# Patient Record
Sex: MALE | Race: WHITE | NOT HISPANIC OR LATINO | URBAN - METROPOLITAN AREA
[De-identification: names, ages, dates, MRNs, and addresses within clinical notes are randomized per-mention and may not be internally consistent; named-entity substitution may affect disease eponyms.]

---

## 2017-10-02 ENCOUNTER — TRANSCRIBE ORDERS (OUTPATIENT)
Dept: PHYSICAL THERAPY | Facility: HOSPITAL | Age: 62
End: 2017-10-02

## 2017-10-02 DIAGNOSIS — I89.0 LYMPHEDEMA: ICD-10-CM

## 2017-10-02 DIAGNOSIS — C32.9 LARYNX CANCER (HCC): Primary | ICD-10-CM

## 2017-10-10 ENCOUNTER — HOSPITAL ENCOUNTER (OUTPATIENT)
Dept: OCCUPATIONAL THERAPY | Facility: HOSPITAL | Age: 62
Setting detail: THERAPIES SERIES
Discharge: HOME | End: 2017-10-10

## 2017-10-10 DIAGNOSIS — I89.0 LYMPHEDEMA IN ADULT PATIENT: Primary | ICD-10-CM

## 2017-10-10 PROCEDURE — 97165 OT EVAL LOW COMPLEX 30 MIN: CPT

## 2017-10-10 NOTE — THERAPY EVALUATION
Outpatient Occupational Therapy Lymphedema Initial Evaluation  [facility]     Patient Name: Lawrence Jefferson  : 1955  MRN: [id number]  Today's Date: 10/10/2017      Visit Date: 10/10/2017    There is no problem list on file for this patient.       Past Medical History:   Diagnosis Date   • Cancer    • Hypertension         Past Surgical History:   Procedure Laterality Date   • HERNIA REPAIR     • LARYNGECTOMY WITH NECK DISSECTION           Visit Dx:     ICD-10-CM ICD-9-CM   1. Lymphedema in adult patient I89.0 457.1             Patient History       10/10/17 1500          History    Chief Complaint Other 1 (comment)   face and neck edema, decreased neck AROM  -RE      Date Current Problem(s) Began 17  -RE      Brief Description of Current Complaint Patient presents for evaluation and treatment of lymphedema of the head and neck following a total larynectomy, BSND 2-5, neck dissection, cricophaaryngeomyotomy, left lat skin and muscle free flap.   -RE      Previous treatment for THIS PROBLEM Rehabilitation   SLP saw for MLD  -RE      Patient/Caregiver Goals Decrease swelling  -RE      How has patient tried to help current problem? currently performing self MLD, AROM and wearing compression at night  -RE      Pain     Pain Location Back  -RE      Pain at Present 1  -RE      Pain at Best 0  -RE      Pain at Worst 2  -RE      Fall Risk Assessment    Any falls in the past year: No  -RE      Services    Are you currently receiving Home Health services No  -RE      Daily Activities    Primary Language English  -RE      How does patient learn best? Reading  -RE      Teaching needs identified Home Exercise Program;Management of Condition  -RE      Patient is concerned about/has problems with Performing job responsibilities/community activities (work, school,   talking,   -RE      Does patient have problems with the following? None  -RE      Barriers to learning Communication;Other (comment)   Aphonia  -RE      Pt  Participated in POC and Goals Yes  -RE      Safety    Are you being hurt, hit, or frightened by anyone at home or in your life? No  -RE      Are you being neglected by a caregiver No  -RE        User Key  (r) = Recorded By, (t) = Taken By, (c) = Cosigned By    Initials Name Provider Type    TAYA Graham Occupational Therapist                Lymphedema       10/10/17 1500          Subjective Comments    Subjective Comments Gave a good history with white board  -RE      Lymphedema Assessment    Lymphedema Classification Face:;Neck:;secondary;stage 1 (Spontaneously Reversible);stage 2 (Spontaneously Irreversible)  -RE      Lymphedema Cancer Related Sx reconstructive;other (comment)   Laryngectomy, neck dissection, Lat free flap  -RE      Lymph Nodes Removed # 49  -RE      Positive Lymph Nodes # 0  -RE      Stage of Cancer Stage III   T3N0M0  -RE      Chemo Received no  -RE      Radiation Therapy Received yes  -RE      Radiation Treatments #/Timeframe 30  -RE      Infections or Cellulitis? no  -RE      Lymphedema Edema Assessment    Ptting Edema Category By grade out of 4  -RE      Pitting Edema + 2/4;Moderate;+ 3/4  -RE      Edema Assessment Comment Edema noted from L ear to R ear includding a firm area submentally. Extends to clavical.  -RE      Skin Changes/Observations    Skin Observations Comment Incisions are well healed and stoma is symmetrical. Skin color is normal. Scar mobility is better on the R side but is impaired bilaterally.    -RE      Lymphedema Sensation    Lymphedema Sensation Comments No sensation inthe central neck, L ear and left neck. Also lacks sensation over the R shoulder and chest area.   -RE      Compression/Skin Care    Compression/Skin Care Comments Pt has compression neck support  -RE        User Key  (r) = Recorded By, (t) = Taken By, (c) = Cosigned By    Initials Name Provider Type    TAYA Graham Occupational Therapist                        Therapy Education        "10/10/17 1883          Therapy Education    Education Details Ptrogram and plan. Revviewed current HEP. Encouraged gentle MLD and slow ROM  -RE      Given HEP;Symptoms/condition management;Edema management  -RE      Program New  -RE      How Provided Verbal;Demonstration  -RE      Provided to Patient  -RE      Level of Understanding Teach back education performed;Verbalized;Demonstrated  -RE        User Key  (r) = Recorded By, (t) = Taken By, (c) = Cosigned By    Initials Name Provider Type    RE Hilary Nunez OTR Occupational Therapist                        OT Goals       10/10/17 1500       OT Short Term Goals    STG Date to Achieve 10/24/17  -RE     STG 1 Patient will demonstrate proper awareness of What is Lymphedema and \"Do's & Don'ts\" for improved prevention, management, care of symptoms and ease of transition to self-care of condition.   -RE     STG 1 Progress New  -RE     STG 2 Patient independent and compliant with self-massage techniques with spouse/family member as needed for improved lymphatic drainage, decreased edema/symptoms, decreased risk of infection and improved transition to self-care of condition.   -RE     STG 2 Progress New;Ongoing  -RE     STG 2 Progress Comments Patient has program from previous therapist which will revise as needed.  -RE     STG 3 Patient independent and compliant with initial home exercise program focused on diaphragmatic breathing, range of motion, flexibility to decrease edema and improve lymphatic flow for decreased edema and decreased risk of infection.   -RE     STG 3 Progress New;Ongoing  -RE     STG 3 Progress Comments Will modify current program as needed.  -RE     STG 4 Patient will report decreased fullness in the neck and face.   -RE     STG 4 Progress New  -RE     Long Term Goals    LTG Date to Achieve 11/10/17  -RE     LTG 1 Edema in the submental area will decrease to 1/4.   -RE     LTG 1 Progress New  -RE     Time Calculation    OT Goal Re-Cert Due Date " 01/10/18  -RE       User Key  (r) = Recorded By, (t) = Taken By, (c) = Cosigned By    Initials Name Provider Type    RE TAYA Childs Occupational Therapist                OT Assessment/Plan       10/10/17 1547       OT Assessment    Functional Limitations Performance in work activities  -RE     Impairments Edema;Impaired lymphatic circulation;Impaired sensory integrity;Integumentary integrity;Range of motion  -RE     Assessment Comments Patient presents with moderate head and neck edema and decreased neck AROM due to radiation and surgery.  He could benefit from Complete Decongestive Therapy to decrease edema, improve scar mobility, improve AROM,  speed healing to allow for prosthesis, and to learn self care strategies.  -RE     Please refer to paper survey for additional self-reported information Yes  -RE     OT Diagnosis Head and neck radiation  -RE     OT Rehab Potential Good  -RE     Patient/caregiver participated in establishment of treatment plan and goals Yes  -RE     Patient would benefit from skilled therapy intervention Yes  -RE     OT Plan    OT Frequency 3x/week  -RE     Predicted Duration of Therapy Intervention (days/wks) 2-4 weeks  -RE     Planned CPT's? OT EVAL LOW COMPLEXITY: 86875;OT THER ACT EA 15 MIN: 91699KP;OT THER PROC EA 15 MIN: 91722ON;OT SELF CARE/MGMT/TRAIN 15 MIN: 28899;OT MANUAL THERAPY EA 15 MIN: 81553  -RE     Planned Therapy Interventions (Optional Details) home exercise program;manual therapy techniques;patient/family education;other (see comments)   compression therapy  -RE       User Key  (r) = Recorded By, (t) = Taken By, (c) = Cosigned By    Initials Name Provider Type    RE TAYA Childs Occupational Therapist                Time Calculation:   OT Start Time: 1330  OT Stop Time: 1415  OT Time Calculation (min): 45 min     Therapy Charges for Today     Code Description Service Date Service Provider Modifiers Qty    [id number]  OT EVAL LOW COMPLEXITY 3 10/10/2017 Hilary  TAYA Nunez GO 1                    TAYA Childs  10/10/2017

## 2017-10-13 ENCOUNTER — HOSPITAL ENCOUNTER (OUTPATIENT)
Dept: OCCUPATIONAL THERAPY | Facility: HOSPITAL | Age: 62
Setting detail: THERAPIES SERIES
Discharge: HOME | End: 2017-10-13

## 2017-10-13 DIAGNOSIS — I89.0 LYMPHEDEMA IN ADULT PATIENT: Primary | ICD-10-CM

## 2017-10-13 PROCEDURE — 97110 THERAPEUTIC EXERCISES: CPT

## 2017-10-13 PROCEDURE — 97140 MANUAL THERAPY 1/> REGIONS: CPT

## 2017-10-13 NOTE — THERAPY TREATMENT NOTE
Outpatient Occupational Therapy Lymphedema Treatment Note  [facility]     Patient Name: Lawrence Jefferson  : 1955  MRN: [id number]  Today's Date: 10/13/2017      Visit Date: 10/13/2017    There is no problem list on file for this patient.       Past Medical History:   Diagnosis Date   • Cancer    • Hypertension         Past Surgical History:   Procedure Laterality Date   • HERNIA REPAIR     • LARYNGECTOMY WITH NECK DISSECTION           Visit Dx:      ICD-10-CM ICD-9-CM   1. Lymphedema in adult patient I89.0 457.1             Lymphedema       10/13/17 1400          Subjective Comments    Subjective Comments Said neck feels tight  -RE      Subjective Pain    Able to rate subjective pain? yes  -RE      Pre-Treatment Pain Level 0  -RE      Post-Treatment Pain Level 0  -RE      Manual Lymphatic Drainage    Manual Lymphatic Drainage initial sequence;opened regional lymph nodes;opened anastamoses;head/neck treatment  -RE      Initial Sequence full neck;cervical;supraclavicular  -RE      Cervical right;left  -RE      Supraclavicular right;left  -RE      Opened Regional Lymph Nodes axillary  -RE      Axillary right;left  -RE      Opened Anastamoses other opened anastamoses  -RE      Head/Neck Treatment pre-auricular nodes;post-auricular nodes;occipital nodes;sublingual nodes;full/complex MLD;other head/neck treatment   Scar massage  -RE      Compression/Skin Care    Compression/Skin Care compression garment   Made Komprex II pad for compression garment to soften edema  -RE        User Key  (r) = Recorded By, (t) = Taken By, (c) = Cosigned By    Initials Name Provider Type    RE TAYA Childs Occupational Therapist                        OT Assessment/Plan       10/13/17 8011       OT Assessment    Assessment Comments Tolerated MLD and scar massage well.  Noted some edema softening and slight increase in scar mobility.  -RE     OT Plan    OT Plan Comments Continue  -RE       User Key  (r) = Recorded By, (t) = Taken  By, (c) = Cosigned By    Initials Name Provider Type    RE TAYA Childs Occupational Therapist                                Therapy Education       10/13/17 4797          Therapy Education    Education Details Do not sleep in new pad only wear garment.  Use pad for max of 30 minuteson and 30 off. Stop use if redness occurs. instucted in scar massage for lat scar. SCM stretch.  -RE      Given Edema management;Bandaging/dressing change  -RE      Program New  -RE      How Provided Verbal;Demonstration  -RE      Provided to Patient  -RE      Level of Understanding Teach back education performed;Verbalized;Demonstrated  -RE        User Key  (r) = Recorded By, (t) = Taken By, (c) = Cosigned By    Initials Name Provider Type    RE TAYA Childs Occupational Therapist                  Time Calculation:   OT Start Time: 1300  OT Stop Time: 1415  OT Time Calculation (min): 75 min       Therapy Charges for Today     Code Description Service Date Service Provider Modifiers Qty    [id number]  OT THER PROC EA 15 MIN 10/13/2017 TAYA Childs GO 1    [id number]  OT MANUAL THERAPY EA 15 MIN 10/13/2017 TAYA Childs GO 4                      TAYA Childs  10/13/2017

## 2017-10-17 ENCOUNTER — HOSPITAL ENCOUNTER (OUTPATIENT)
Dept: OCCUPATIONAL THERAPY | Facility: HOSPITAL | Age: 62
Setting detail: THERAPIES SERIES
Discharge: HOME | End: 2017-10-17

## 2017-10-17 DIAGNOSIS — I89.0 LYMPHEDEMA IN ADULT PATIENT: Primary | ICD-10-CM

## 2017-10-17 PROCEDURE — 97140 MANUAL THERAPY 1/> REGIONS: CPT

## 2017-10-17 NOTE — THERAPY TREATMENT NOTE
Outpatient Occupational Therapy Lymphedema Treatment Note  [facility]     Patient Name: Lawrence Jefferson  : 1955  MRN: [id number]  Today's Date: 10/17/2017      Visit Date: 10/17/2017    There is no problem list on file for this patient.       Past Medical History:   Diagnosis Date   • Cancer    • Hypertension         Past Surgical History:   Procedure Laterality Date   • HERNIA REPAIR     • LARYNGECTOMY WITH NECK DISSECTION           Visit Dx:      ICD-10-CM ICD-9-CM   1. Lymphedema in adult patient I89.0 457.1             Lymphedema       10/17/17 1400          Subjective Comments    Subjective Comments Pt c/o some increased swelling. He had to lower the head of the bed a little because his wife couldn't sleep.  -RE      Subjective Pain    Able to rate subjective pain? yes  -RE      Pre-Treatment Pain Level 0  -RE      Post-Treatment Pain Level 0  -RE      Manual Lymphatic Drainage    Manual Lymphatic Drainage initial sequence;opened regional lymph nodes;opened anastamoses;head/neck treatment  -RE      Initial Sequence full neck;cervical;supraclavicular;shoulder collectors  -RE      Cervical right;left  -RE      Supraclavicular right;left  -RE      Shoulder Collectors right;left  -RE      Opened Regional Lymph Nodes axillary  -RE      Axillary right;left  -RE      Opened Anastamoses other opened anastamoses  -RE      Head/Neck Treatment pre-auricular nodes;post-auricular nodes;occipital nodes;sublingual nodes;full/complex MLD  -RE      Compression/Skin Care    Compression/Skin Care Comments Cut pad to provide more of a chevron to allow fluid to flow around scars.  -RE        User Key  (r) = Recorded By, (t) = Taken By, (c) = Cosigned By    Initials Name Provider Type    RE TAYA Childs Occupational Therapist                        OT Assessment/Plan       10/17/17 2263       OT Assessment    Assessment Comments Submental swelling was increased today but responded  to MLD.  L side of neck with more soft  tissue tightness than right. Pt is pereforming AROM , scar massage and gentle stretches at home.  -RE     OT Plan    OT Plan Comments Continue  -RE       User Key  (r) = Recorded By, (t) = Taken By, (c) = Cosigned By    Initials Name Provider Type    TAYA Graham Occupational Therapist                                Therapy Education       10/17/17 6419          Therapy Education    Education Details Wear new pad for 30 min and thenmay gradually increase wear as his skin tolerates it.    -RE      Given Symptoms/condition management;Edema management;Bandaging/dressing change  -RE      Program Modified  -RE      How Provided Verbal;Demonstration  -RE      Level of Understanding Teach back education performed;Verbalized;Demonstrated  -RE        User Key  (r) = Recorded By, (t) = Taken By, (c) = Cosigned By    Initials Name Provider Type    TAYA Graham Occupational Therapist                  Time Calculation:   OT Start Time: 1300  OT Stop Time: 1400  OT Time Calculation (min): 60 min       Therapy Charges for Today     Code Description Service Date Service Provider Modifiers Qty    [id number]  OT MANUAL THERAPY EA 15 MIN 10/17/2017 TAYA Childs GO 4                      TAYA Childs  10/17/2017

## 2017-10-19 ENCOUNTER — APPOINTMENT (OUTPATIENT)
Dept: OCCUPATIONAL THERAPY | Facility: HOSPITAL | Age: 62
End: 2017-10-19

## 2017-10-20 ENCOUNTER — APPOINTMENT (OUTPATIENT)
Dept: OCCUPATIONAL THERAPY | Facility: HOSPITAL | Age: 62
End: 2017-10-20

## 2017-10-24 ENCOUNTER — HOSPITAL ENCOUNTER (OUTPATIENT)
Dept: OCCUPATIONAL THERAPY | Facility: HOSPITAL | Age: 62
Setting detail: THERAPIES SERIES
Discharge: HOME | End: 2017-10-24

## 2017-10-24 DIAGNOSIS — I89.0 LYMPHEDEMA IN ADULT PATIENT: Primary | ICD-10-CM

## 2017-10-24 PROCEDURE — 97140 MANUAL THERAPY 1/> REGIONS: CPT

## 2017-10-24 NOTE — THERAPY TREATMENT NOTE
Outpatient Occupational Therapy Lymphedema Treatment Note  [facility]     Patient Name: Lawrence Jefferson  : 1955  MRN: [id number]  Today's Date: 10/24/2017      Visit Date: 10/24/2017    There is no problem list on file for this patient.       Past Medical History:   Diagnosis Date   • Cancer    • Hypertension         Past Surgical History:   Procedure Laterality Date   • HERNIA REPAIR     • LARYNGECTOMY WITH NECK DISSECTION           Visit Dx:      ICD-10-CM ICD-9-CM   1. Lymphedema in adult patient I89.0 457.1             Lymphedema       10/24/17 1600          Subjective Comments    Subjective Comments Reports some soreness the day following last session. It resolved. Now able to speak!  -RE      Subjective Pain    Able to rate subjective pain? yes  -RE      Pre-Treatment Pain Level 0  -RE      Post-Treatment Pain Level 0  -RE      Manual Lymphatic Drainage    Manual Lymphatic Drainage initial sequence;opened regional lymph nodes;opened anastamoses;head/neck treatment  -RE      Initial Sequence full neck;cervical;supraclavicular;shoulder collectors  -RE      Cervical right;left  -RE      Head/Neck Treatment pre-auricular nodes;post-auricular nodes;occipital nodes;sublingual nodes;full/complex MLD  -RE      Manual Lymphatic Drainage Comments Scar massage and gentle stretching for neck AROM.  -RE        User Key  (r) = Recorded By, (t) = Taken By, (c) = Cosigned By    Initials Name Provider Type    TAYA Graham Occupational Therapist                        OT Assessment/Plan       10/24/17 1610       OT Assessment    Assessment Comments Edema improved today.  Edema is softer and the skin is more supple.  Progressing well.    -RE     OT Plan    OT Plan Comments Continue  -RE       User Key  (r) = Recorded By, (t) = Taken By, (c) = Cosigned By    Initials Name Provider Type    TAYA Graham Occupational Therapist                                Therapy Education       10/24/17 1611          Therapy  Education    Given HEP  -RE      Program Reinforced  -RE      How Provided Verbal;Demonstration  -RE      Provided to Patient  -RE      Level of Understanding Teach back education performed;Verbalized  -RE        User Key  (r) = Recorded By, (t) = Taken By, (c) = Cosigned By    Initials Name Provider Type    RE TAYA Childs Occupational Therapist                  Time Calculation:   OT Start Time: 1315  OT Stop Time: 1410  OT Time Calculation (min): 55 min       Therapy Charges for Today     Code Description Service Date Service Provider Modifiers Qty    [id number]  OT MANUAL THERAPY EA 15 MIN 10/24/2017 TAYA Childs GO 4                      TAYA Childs  10/24/2017

## 2017-10-26 ENCOUNTER — APPOINTMENT (OUTPATIENT)
Dept: OCCUPATIONAL THERAPY | Facility: HOSPITAL | Age: 62
End: 2017-10-26

## 2017-10-27 ENCOUNTER — HOSPITAL ENCOUNTER (OUTPATIENT)
Dept: OCCUPATIONAL THERAPY | Facility: HOSPITAL | Age: 62
Setting detail: THERAPIES SERIES
Discharge: HOME | End: 2017-10-27

## 2017-10-27 DIAGNOSIS — I89.0 LYMPHEDEMA IN ADULT PATIENT: Primary | ICD-10-CM

## 2017-10-27 PROCEDURE — 97140 MANUAL THERAPY 1/> REGIONS: CPT

## 2017-10-27 PROCEDURE — 97110 THERAPEUTIC EXERCISES: CPT

## 2017-10-27 NOTE — THERAPY TREATMENT NOTE
Outpatient Occupational Therapy Lymphedema Treatment Note  [facility]     Patient Name: Lawrence Jefferson  : 1955  MRN: [id number]  Today's Date: 10/27/2017      Visit Date: 10/27/2017    There is no problem list on file for this patient.       Past Medical History:   Diagnosis Date   • Cancer    • Hypertension         Past Surgical History:   Procedure Laterality Date   • HERNIA REPAIR     • LARYNGECTOMY WITH NECK DISSECTION           Visit Dx:      ICD-10-CM ICD-9-CM   1. Lymphedema in adult patient I89.0 457.1             Lymphedema       10/27/17 1400          Subjective Comments    Subjective Comments No new compaints  -RE      Subjective Pain    Able to rate subjective pain? yes  -RE      Pre-Treatment Pain Level 0  -RE      Post-Treatment Pain Level 0  -RE      Manual Lymphatic Drainage    Manual Lymphatic Drainage initial sequence;opened regional lymph nodes;opened anastamoses;head/neck treatment  -RE      Initial Sequence full neck;cervical;supraclavicular;shoulder collectors  -RE      Cervical right;left  -RE      Supraclavicular right;left  -RE      Shoulder Collectors right;left  -RE      Opened Regional Lymph Nodes axillary  -RE      Axillary right;left  -RE      Opened Anastamoses other opened anastamoses  -RE      Head/Neck Treatment pre-auricular nodes;post-auricular nodes;occipital nodes;sublingual nodes;full/complex MLD  -RE      Manual Lymphatic Drainage Comments Scar massage and gentle manual stretching in the neck area  -RE      Compression/Skin Care    Compression/Skin Care Comments fabricated an elastomer patch to increase compression and remodel scar tissue  -RE        User Key  (r) = Recorded By, (t) = Taken By, (c) = Cosigned By    Initials Name Provider Type    RE TAYA Childs Occupational Therapist                        OT Assessment/Plan       10/27/17 1443       OT Assessment    Assessment Comments Edema continues to decrease revealing a visible jaw line.  Scar areas continue  firm with decreased mobility of the soft tisssue.  Pt to also order the Bains swell spot for night use.    -RE     OT Plan    OT Plan Comments Continue  -RE       User Key  (r) = Recorded By, (t) = Taken By, (c) = Cosigned By    Initials Name Provider Type    TAYA Graham Occupational Therapist                                Therapy Education       10/27/17 1445          Therapy Education    Education Details Wear elastomer patch with current compression garment. Order Bains swell spot. Discussed Flexitouch and its purpose in self management as needed inthe future.  -RE      Given Symptoms/condition management;Bandaging/dressing change  -RE      Program New  -RE      How Provided Verbal;Demonstration;Written  -RE      Provided to Patient  -RE      Level of Understanding Teach back education performed;Verbalized  -RE        User Key  (r) = Recorded By, (t) = Taken By, (c) = Cosigned By    Initials Name Provider Type    TAYA Graham Occupational Therapist                  Time Calculation:   OT Start Time: 1300  OT Stop Time: 1415  OT Time Calculation (min): 75 min       Therapy Charges for Today     Code Description Service Date Service Provider Modifiers Qty    [id number]  OT THER PROC EA 15 MIN 10/27/2017 TAAY Childs GO 1    [id number] HC OT MANUAL THERAPY EA 15 MIN 10/27/2017 TAYA Childs GO 4                      TAYA Childs  10/27/2017

## 2017-10-31 ENCOUNTER — HOSPITAL ENCOUNTER (OUTPATIENT)
Dept: OCCUPATIONAL THERAPY | Facility: HOSPITAL | Age: 62
Setting detail: THERAPIES SERIES
Discharge: HOME | End: 2017-10-31

## 2017-10-31 DIAGNOSIS — I89.0 LYMPHEDEMA IN ADULT PATIENT: Primary | ICD-10-CM

## 2017-10-31 PROCEDURE — 97140 MANUAL THERAPY 1/> REGIONS: CPT

## 2017-11-02 ENCOUNTER — HOSPITAL ENCOUNTER (OUTPATIENT)
Dept: OCCUPATIONAL THERAPY | Facility: HOSPITAL | Age: 62
Setting detail: THERAPIES SERIES
Discharge: HOME | End: 2017-11-02

## 2017-11-02 DIAGNOSIS — I89.0 LYMPHEDEMA IN ADULT PATIENT: Primary | ICD-10-CM

## 2017-11-02 PROCEDURE — 97140 MANUAL THERAPY 1/> REGIONS: CPT

## 2017-11-02 NOTE — THERAPY TREATMENT NOTE
Outpatient Occupational Therapy Lymphedema Treatment Note  [facility]     Patient Name: Lawrence Jefferson  : 1955  MRN: [id number]  Today's Date: 2017      Visit Date: 2017    There is no problem list on file for this patient.       Past Medical History:   Diagnosis Date   • Cancer    • Hypertension         Past Surgical History:   Procedure Laterality Date   • HERNIA REPAIR     • LARYNGECTOMY WITH NECK DISSECTION           Visit Dx:      ICD-10-CM ICD-9-CM   1. Lymphedema in adult patient I89.0 457.1             Lymphedema       17 1900          Subjective Comments    Subjective Comments My sensation is improving and the swelling continues to decrease.  -RE      Subjective Pain    Able to rate subjective pain? yes  -RE      Pre-Treatment Pain Level 0  -RE      Post-Treatment Pain Level 0  -RE      Manual Lymphatic Drainage    Manual Lymphatic Drainage initial sequence;opened regional lymph nodes;opened anastamoses;head/neck treatment  -RE      Initial Sequence full neck;cervical;supraclavicular;shoulder collectors  -RE      Cervical right;left  -RE      Supraclavicular right;left  -RE      Shoulder Collectors right;left  -RE      Opened Regional Lymph Nodes axillary  -RE      Axillary right;left  -RE      Opened Anastamoses other opened anastamoses  -RE      Head/Neck Treatment pre-auricular nodes;post-auricular nodes;occipital nodes;sublingual nodes;full/complex MLD  -RE      Manual Lymphatic Drainage Comments scar massage and manual stretching  -RE        User Key  (r) = Recorded By, (t) = Taken By, (c) = Cosigned By    Initials Name Provider Type    RE Hilary Nunez OTALEX Occupational Therapist                        OT Assessment/Plan       17       OT Assessment    Assessment Comments Edema continues to decrease slowly but steadily.  Does not tolerate elastomer all night yet.  -RE     OT Plan    OT Plan Comments Continue  -RE       User Key  (r) = Recorded By, (t) = Taken By, (c)  = Cosigned By    Initials Name Provider Type    TAYA Graham Occupational Therapist                                Therapy Education       11/02/17 1949          Therapy Education    Given Symptoms/condition management;Edema management  -RE      Program Reinforced  -RE      How Provided Verbal  -RE      Provided to Patient  -RE      Level of Understanding Teach back education performed;Verbalized  -RE        User Key  (r) = Recorded By, (t) = Taken By, (c) = Cosigned By    Initials Name Provider Type    TAYA Graham Occupational Therapist                  Time Calculation:   OT Start Time: 1315  OT Stop Time: 1410  OT Time Calculation (min): 55 min       Therapy Charges for Today     Code Description Service Date Service Provider Modifiers Qty    [id number]  OT MANUAL THERAPY EA 15 MIN 11/2/2017 TAYA Childs GO 4                      TAYA Childs  11/2/2017

## 2017-11-03 ENCOUNTER — HOSPITAL ENCOUNTER (OUTPATIENT)
Dept: OCCUPATIONAL THERAPY | Facility: HOSPITAL | Age: 62
Setting detail: THERAPIES SERIES
Discharge: HOME | End: 2017-11-03

## 2017-11-03 DIAGNOSIS — I89.0 LYMPHEDEMA IN ADULT PATIENT: Primary | ICD-10-CM

## 2017-11-03 PROCEDURE — 97140 MANUAL THERAPY 1/> REGIONS: CPT

## 2017-11-03 NOTE — THERAPY TREATMENT NOTE
Outpatient Occupational Therapy Lymphedema Treatment Note  [facility]     Patient Name: Lawrence Jefferson  : 1955  MRN: [id number]  Today's Date: 11/3/2017      Visit Date: 2017    There is no problem list on file for this patient.       Past Medical History:   Diagnosis Date   • Cancer    • Hypertension         Past Surgical History:   Procedure Laterality Date   • HERNIA REPAIR     • LARYNGECTOMY WITH NECK DISSECTION           Visit Dx:      ICD-10-CM ICD-9-CM   1. Lymphedema in adult patient I89.0 457.1             Lymphedema       17 1400 17 1900       Subjective Comments    Subjective Comments feels edema is increased.  Did not sleep withhis head elevated.  -RE My sensation is improving and the swelling continues to decrease.  -RE     Subjective Pain    Able to rate subjective pain? yes  -RE yes  -RE     Pre-Treatment Pain Level 0  -RE 0  -RE     Post-Treatment Pain Level 0  -RE 0  -RE     Manual Lymphatic Drainage    Manual Lymphatic Drainage initial sequence;opened regional lymph nodes;opened anastamoses;head/neck treatment  -RE initial sequence;opened regional lymph nodes;opened anastamoses;head/neck treatment  -RE     Initial Sequence full neck;cervical;supraclavicular;shoulder collectors  -RE full neck;cervical;supraclavicular;shoulder collectors  -RE     Cervical right;left  -RE right;left  -RE     Supraclavicular right;left  -RE right;left  -RE     Shoulder Collectors right;left  -RE right;left  -RE     Opened Regional Lymph Nodes axillary  -RE axillary  -RE     Axillary right;left  -RE right;left  -RE     Opened Anastamoses other opened anastamoses  -RE other opened anastamoses  -RE     Head/Neck Treatment pre-auricular nodes;post-auricular nodes;occipital nodes;sublingual nodes;full/complex MLD  -RE pre-auricular nodes;post-auricular nodes;occipital nodes;sublingual nodes;full/complex MLD  -RE     Manual Lymphatic Drainage Comments Scar massage bilaterally  -RE scar massage and  manual stretching  -RE       User Key  (r) = Recorded By, (t) = Taken By, (c) = Cosigned By    Initials Name Provider Type    RE TAYA Childs Occupational Therapist                        OT Assessment/Plan       11/03/17 1458 11/02/17 1948    OT Assessment    Assessment Comments Edema is increased today but pt report feeling better following MLD. Scar mobility is improving.  -RE Edema continues to decrease slowly but steadily.  Does not tolerate elastomer all night yet.  -RE    OT Plan    OT Plan Comments Continue  -RE Continue  -RE      User Key  (r) = Recorded By, (t) = Taken By, (c) = Cosigned By    Initials Name Provider Type    RE TAYA Childs Occupational Therapist                                Therapy Education       11/03/17 1459 11/02/17 1949       Therapy Education    Given HEP;Edema management  -RE Symptoms/condition management;Edema management  -RE     Program Progressed  -RE Reinforced  -RE     How Provided Verbal  -RE Verbal  -RE     Provided to Patient  -RE Patient  -RE     Level of Understanding Teach back education performed;Verbalized  -RE Teach back education performed;Verbalized  -RE       User Key  (r) = Recorded By, (t) = Taken By, (c) = Cosigned By    Initials Name Provider Type    RE TAYA Childs Occupational Therapist                  Time Calculation:   OT Start Time: 1300  OT Stop Time: 1400  OT Time Calculation (min): 60 min       Therapy Charges for Today     Code Description Service Date Service Provider Modifiers Qty    [id number]  OT MANUAL THERAPY EA 15 MIN 11/3/2017 TAYA Childs GO 4                      TAYA Childs  11/3/2017

## 2017-11-07 ENCOUNTER — APPOINTMENT (OUTPATIENT)
Dept: OCCUPATIONAL THERAPY | Facility: HOSPITAL | Age: 62
End: 2017-11-07

## 2017-11-10 ENCOUNTER — APPOINTMENT (OUTPATIENT)
Dept: OCCUPATIONAL THERAPY | Facility: HOSPITAL | Age: 62
End: 2017-11-10

## 2017-11-14 ENCOUNTER — HOSPITAL ENCOUNTER (OUTPATIENT)
Dept: OCCUPATIONAL THERAPY | Facility: HOSPITAL | Age: 62
Setting detail: THERAPIES SERIES
Discharge: HOME | End: 2017-11-14

## 2017-11-14 DIAGNOSIS — I89.0 LYMPHEDEMA IN ADULT PATIENT: Primary | ICD-10-CM

## 2017-11-14 PROCEDURE — 97140 MANUAL THERAPY 1/> REGIONS: CPT

## 2017-11-14 NOTE — THERAPY PROGRESS REPORT/RE-CERT
Outpatient Occupational Therapy Lymphedema Progress Note  [facility]     Patient Name: Lawrence Jefferson  : 1955  MRN: [id number]  Today's Date: 2017      Visit Date: 2017    There is no problem list on file for this patient.       Past Medical History:   Diagnosis Date   • Cancer    • Hypertension         Past Surgical History:   Procedure Laterality Date   • HERNIA REPAIR     • LARYNGECTOMY WITH NECK DISSECTION           Visit Dx:      ICD-10-CM ICD-9-CM   1. Lymphedema in adult patient I89.0 457.1             Lymphedema       17 1100          Subjective Comments    Subjective Comments NO new complaints. Reports good improvement through the day.  Neck is softer .  -RE      Subjective Pain    Able to rate subjective pain? yes  -RE      Pre-Treatment Pain Level 0  -RE      Post-Treatment Pain Level 0  -RE      Manual Lymphatic Drainage    Manual Lymphatic Drainage initial sequence;opened regional lymph nodes;opened anastamoses;head/neck treatment  -RE      Initial Sequence full neck;cervical;supraclavicular;shoulder collectors  -RE      Cervical right;left  -RE      Supraclavicular right;left  -RE      Shoulder Collectors right;left  -RE      Opened Regional Lymph Nodes axillary  -RE      Axillary right;left  -RE      Opened Anastamoses other opened anastamoses  -RE      Head/Neck Treatment pre-auricular nodes;post-auricular nodes;occipital nodes;sublingual nodes;full/complex MLD  -RE      Manual Lymphatic Drainage Comments Continued scar massage and streching   -RE        User Key  (r) = Recorded By, (t) = Taken By, (c) = Cosigned By    Initials Name Provider Type    RE TAYA Childs Occupational Therapist                        OT Assessment/Plan       17 1143       OT Assessment    Assessment Comments Soft tissue continue to be tight but is improving.  Edema is decreased with noticable wrinkes in the skin.  The submental area contiues to be firm.  Plan to revise the elastomer patch  "on the next visit to further soften the edema and model the scar tissue.   -RE     OT Plan    OT Plan Comments Continue  -RE       User Key  (r) = Recorded By, (t) = Taken By, (c) = Cosigned By    Initials Name Provider Type    TAYA Graham Occupational Therapist                            OT Goals       11/14/17 1100       OT Short Term Goals    STG Date to Achieve 11/28/17  -RE     STG 1 Patient will demonstrate proper awareness of What is Lymphedema and \"Do's & Don'ts\" for improved prevention, management, care of symptoms and ease of transition to self-care of condition.   -RE     STG 1 Progress Met  -RE     STG 2 Patient independent and compliant with self-massage techniques with spouse/family member as needed for improved lymphatic drainage, decreased edema/symptoms, decreased risk of infection and improved transition to self-care of condition.   -RE     STG 2 Progress Ongoing  -RE     STG 3 Patient independent and compliant with initial home exercise program focused on diaphragmatic breathing, range of motion, flexibility to decrease edema and improve lymphatic flow for decreased edema and decreased risk of infection.   -RE     STG 3 Progress Ongoing;Met  -RE     STG 4 Patient will report decreased fullness in the neck and face.   -RE     STG 4 Progress Met;Ongoing  -RE     Long Term Goals    LTG Date to Achieve 12/14/17  -RE     LTG 1 Edema in the submental area will decrease to 1/4.   -RE     LTG 1 Progress Not Met;Ongoing  -RE     LTG 2 Pt will be independant and complaint with wearing the Bains swell spot.  -RE     LTG 2 Progress New  -RE     Time Calculation    OT Goal Re-Cert Due Date 01/10/18  -RE       User Key  (r) = Recorded By, (t) = Taken By, (c) = Cosigned By    Initials Name Provider Type    TAYA Graham Occupational Therapist                Therapy Education       11/14/17 1142          Therapy Education    Education Details Reminded to order Bains Swell Spot.   -RE      " Given Symptoms/condition management;Bandaging/dressing change  -RE      Program Reinforced  -RE      How Provided Verbal  -RE      Provided to Patient  -RE      Level of Understanding Teach back education performed;Verbalized  -RE        User Key  (r) = Recorded By, (t) = Taken By, (c) = Cosigned By    Initials Name Provider Type    RE TAYA Childs Occupational Therapist                  Time Calculation:   OT Start Time: 1000  OT Stop Time: 1045  OT Time Calculation (min): 45 min       Therapy Charges for Today     Code Description Service Date Service Provider Modifiers Qty    [id number]  OT MANUAL THERAPY EA 15 MIN 11/14/2017 TAYA Childs GO 3                      TAYA Childs  11/14/2017

## 2017-11-16 ENCOUNTER — HOSPITAL ENCOUNTER (OUTPATIENT)
Dept: OCCUPATIONAL THERAPY | Facility: HOSPITAL | Age: 62
Setting detail: THERAPIES SERIES
Discharge: HOME | End: 2017-11-16

## 2017-11-16 DIAGNOSIS — I89.0 LYMPHEDEMA IN ADULT PATIENT: Primary | ICD-10-CM

## 2017-11-16 PROCEDURE — 97110 THERAPEUTIC EXERCISES: CPT

## 2017-11-16 PROCEDURE — 97140 MANUAL THERAPY 1/> REGIONS: CPT

## 2017-11-16 NOTE — THERAPY TREATMENT NOTE
Outpatient Occupational Therapy Lymphedema Treatment Note  [facility]     Patient Name: Lawrence Jefferson  : 1955  MRN: [id number]  Today's Date: 2017      Visit Date: 2017    There is no problem list on file for this patient.       Past Medical History:   Diagnosis Date   • Cancer    • Hypertension         Past Surgical History:   Procedure Laterality Date   • HERNIA REPAIR     • LARYNGECTOMY WITH NECK DISSECTION           Visit Dx:      ICD-10-CM ICD-9-CM   1. Lymphedema in adult patient I89.0 457.1             Lymphedema       17 1600          Subjective Comments    Subjective Comments C/o some bleeding and irritation of his stoma.   -RE      Subjective Pain    Able to rate subjective pain? yes  -RE      Pre-Treatment Pain Level 0  -RE      Post-Treatment Pain Level 0  -RE      Manual Lymphatic Drainage    Manual Lymphatic Drainage initial sequence;opened regional lymph nodes;opened anastamoses;head/neck treatment  -RE      Initial Sequence full neck;cervical;supraclavicular;shoulder collectors  -RE      Cervical right;left  -RE      Supraclavicular right;left  -RE      Shoulder Collectors right;left  -RE      Opened Regional Lymph Nodes axillary  -RE      Axillary right;left  -RE      Opened Anastamoses other opened anastamoses  -RE      Head/Neck Treatment pre-auricular nodes;post-auricular nodes;occipital nodes;sublingual nodes;full/complex MLD  -RE      Manual Lymphatic Drainage Comments scar massage  -RE      Compression/Skin Care    Compression/Skin Care Comments fabricated new elastomer patch.  -RE        User Key  (r) = Recorded By, (t) = Taken By, (c) = Cosigned By    Initials Name Provider Type    RE TYAA Childs Occupational Therapist                        OT Assessment/Plan       17 1621       OT Assessment    Assessment Comments Skin continues to have more wrinkles indicating further decrease.  Edema in the submental area is most pronounced.  Progressing well.   -RE      OT Plan    OT Plan Comments continue  -RE       User Key  (r) = Recorded By, (t) = Taken By, (c) = Cosigned By    Initials Name Provider Type    TAYA Graham Occupational Therapist                                Therapy Education       11/16/17 1620          Therapy Education    Given Edema management;Symptoms/condition management  -RE      Program Reinforced  -RE      How Provided Verbal  -RE      Provided to Patient  -RE      Level of Understanding Teach back education performed;Verbalized  -RE        User Key  (r) = Recorded By, (t) = Taken By, (c) = Cosigned By    Initials Name Provider Type    TAYA Graham Occupational Therapist                  Time Calculation:   OT Start Time: 1015  OT Stop Time: 1110  OT Time Calculation (min): 55 min       Therapy Charges for Today     Code Description Service Date Service Provider Modifiers Qty    [id number]  OT MANUAL THERAPY EA 15 MIN 11/16/2017 TAYA Childs GO 3    [id number]  OT THER PROC EA 15 MIN 11/16/2017 TAYA Childs GO 1                      TAYA Childs  11/16/2017

## 2017-11-17 ENCOUNTER — HOSPITAL ENCOUNTER (OUTPATIENT)
Dept: OCCUPATIONAL THERAPY | Facility: HOSPITAL | Age: 62
Setting detail: THERAPIES SERIES
Discharge: HOME | End: 2017-11-17

## 2017-11-17 DIAGNOSIS — I89.0 LYMPHEDEMA IN ADULT PATIENT: Primary | ICD-10-CM

## 2017-11-17 PROCEDURE — 97140 MANUAL THERAPY 1/> REGIONS: CPT

## 2017-11-17 NOTE — THERAPY TREATMENT NOTE
Outpatient Occupational Therapy Lymphedema Treatment Note  [facility]     Patient Name: Lawrence Jefferson  : 1955  MRN: [id number]  Today's Date: 2017      Visit Date: 2017    There is no problem list on file for this patient.       Past Medical History:   Diagnosis Date   • Cancer    • Hypertension         Past Surgical History:   Procedure Laterality Date   • HERNIA REPAIR     • LARYNGECTOMY WITH NECK DISSECTION           Visit Dx:      ICD-10-CM ICD-9-CM   1. Lymphedema in adult patient I89.0 457.1             Lymphedema       17 1200 17 1600       Subjective Comments    Subjective Comments Has no tworn the new patch yet.  -RE C/o some bleeding and irritation of his stoma.   -RE     Subjective Pain    Able to rate subjective pain? yes  -RE yes  -RE     Pre-Treatment Pain Level 0  -RE 0  -RE     Post-Treatment Pain Level 0  -RE 0  -RE     Manual Lymphatic Drainage    Manual Lymphatic Drainage initial sequence;opened regional lymph nodes;opened anastamoses;head/neck treatment  -RE initial sequence;opened regional lymph nodes;opened anastamoses;head/neck treatment  -RE     Initial Sequence full neck;cervical;supraclavicular;shoulder collectors  -RE full neck;cervical;supraclavicular;shoulder collectors  -RE     Cervical right;left  -RE right;left  -RE     Supraclavicular right;left  -RE right;left  -RE     Shoulder Collectors right;left  -RE right;left  -RE     Opened Regional Lymph Nodes axillary  -RE axillary  -RE     Axillary right;left  -RE right;left  -RE     Opened Anastamoses other opened anastamoses  -RE other opened anastamoses  -RE     Head/Neck Treatment pre-auricular nodes;post-auricular nodes;occipital nodes;sublingual nodes;full/complex MLD  -RE pre-auricular nodes;post-auricular nodes;occipital nodes;sublingual nodes;full/complex MLD  -RE     Manual Lymphatic Drainage Comments Stretching and scar massage on the R neck and chest area  -RE scar massage  -RE      Compression/Skin Care    Compression/Skin Care Comments  fabricated new elastomer patch.  -RE       User Key  (r) = Recorded By, (t) = Taken By, (c) = Cosigned By    Initials Name Provider Type    TAYA Graham Occupational Therapist                        OT Assessment/Plan       11/17/17 1258 11/16/17 1621    OT Assessment    Assessment Comments Right side of the neck is slightly more edematous than left or could be some scar or fibrotic tissue.  Following MLD the neck area was softer indicating a decrease in edema.  Good progress this week.   -RE Skin continues to have more wrinkles indicating further decrease.  Edema in the submental area is most pronounced.  Progressing well.   -RE    OT Plan    OT Plan Comments Continue  -RE continue  -RE      User Key  (r) = Recorded By, (t) = Taken By, (c) = Cosigned By    Initials Name Provider Type    TAYA Graham Occupational Therapist                                Therapy Education       11/17/17 1256 11/16/17 1620       Therapy Education    Education Details Reviewed HEP and suggested performing in the mirror as sensation is decreased.  -RE      Given HEP  -RE Edema management;Symptoms/condition management  -RE     Program Reinforced  -RE Reinforced  -RE     How Provided Verbal  -RE Verbal  -RE     Provided to Patient  -RE Patient  -RE     Level of Understanding Teach back education performed;Verbalized  -RE Teach back education performed;Verbalized  -RE       User Key  (r) = Recorded By, (t) = Taken By, (c) = Cosigned By    Initials Name Provider Type    TAYA Graham Occupational Therapist                  Time Calculation:   OT Start Time: 1000  OT Stop Time: 1055  OT Time Calculation (min): 55 min       Therapy Charges for Today     Code Description Service Date Service Provider Modifiers Qty    [id number]  OT MANUAL THERAPY EA 15 MIN 11/17/2017 TAYA Childs GO 4                      TAYA Childs  11/17/2017

## 2017-11-28 ENCOUNTER — HOSPITAL ENCOUNTER (OUTPATIENT)
Dept: OCCUPATIONAL THERAPY | Facility: HOSPITAL | Age: 62
Setting detail: THERAPIES SERIES
Discharge: HOME | End: 2017-11-28

## 2017-11-28 DIAGNOSIS — I89.0 LYMPHEDEMA IN ADULT PATIENT: Primary | ICD-10-CM

## 2017-11-28 PROCEDURE — 97140 MANUAL THERAPY 1/> REGIONS: CPT

## 2017-11-28 NOTE — THERAPY TREATMENT NOTE
Outpatient Occupational Therapy Lymphedema Treatment Note  [facility]     Patient Name: Lawrence Jefferson  : 1955  MRN: [id number]  Today's Date: 2017      Visit Date: 2017    There is no problem list on file for this patient.       Past Medical History:   Diagnosis Date   • Cancer    • Hypertension         Past Surgical History:   Procedure Laterality Date   • HERNIA REPAIR     • LARYNGECTOMY WITH NECK DISSECTION           Visit Dx:      ICD-10-CM ICD-9-CM   1. Lymphedema in adult patient I89.0 457.1             Lymphedema       17 1300          Subjective Comments    Subjective Comments Has had some trouble with trach irritation and irritation round his stoma which seems to be made worse by using the elastomer  -RE      Subjective Pain    Able to rate subjective pain? yes  -RE      Pre-Treatment Pain Level 0  -RE      Post-Treatment Pain Level 0  -RE      Manual Lymphatic Drainage    Manual Lymphatic Drainage initial sequence;opened regional lymph nodes;opened anastamoses;head/neck treatment  -RE      Initial Sequence full neck;cervical;supraclavicular;shoulder collectors  -RE      Cervical right;left  -RE      Supraclavicular right;left  -RE      Shoulder Collectors right;left  -RE      Opened Regional Lymph Nodes axillary  -RE      Axillary right;left  -RE      Opened Anastamoses other opened anastamoses  -RE      Head/Neck Treatment pre-auricular nodes;post-auricular nodes;occipital nodes;sublingual nodes;full/complex MLD  -RE      Manual Lymphatic Drainage Comments Stretching and scar massage to R neck.  pt feels this is much tighter than the left  -RE        User Key  (r) = Recorded By, (t) = Taken By, (c) = Cosigned By    Initials Name Provider Type    TAYA Graham Occupational Therapist                        OT Assessment/Plan       17 7418       OT Assessment    Assessment Comments Noted firmness inthe R lateral neck which could be edema or mild fibrosis.  It did soften  "with MLD.  Overall his edema continues to decrease and the skin isvisibly wrinked particularly on the right side.  Sleeping position is of some concern.  He should sleep with his head elevated and is having some difficulty doing this.      -RE     OT Plan    OT Plan Comments Continue  -RE       User Key  (r) = Recorded By, (t) = Taken By, (c) = Cosigned By    Initials Name Provider Type    RE Hilary Nunez OTR Occupational Therapist                            OT Goals       11/28/17 1400       OT Short Term Goals    STG Date to Achieve 12/12/17  -RE     STG 1 Patient will demonstrate proper awareness of What is Lymphedema and \"Do's & Don'ts\" for improved prevention, management, care of symptoms and ease of transition to self-care of condition.   -RE     STG 2 Patient independent and compliant with self-massage techniques with spouse/family member as needed for improved lymphatic drainage, decreased edema/symptoms, decreased risk of infection and improved transition to self-care of condition.   -RE     STG 2 Progress Met;Ongoing  -RE     STG 3 Patient independent and compliant with initial home exercise program focused on diaphragmatic breathing, range of motion, flexibility to decrease edema and improve lymphatic flow for decreased edema and decreased risk of infection.   -RE     STG 3 Progress Met;Ongoing  -RE     STG 4 Patient will report decreased fullness in the neck and face.   -RE     STG 4 Progress Met;Ongoing  -RE     STG 4 Progress Comments submental area and R nick continue to feel full and have palpable edema.  -RE     Long Term Goals    LTG Date to Achieve 12/14/17  -RE     LTG 1 Edema in the submental area will decrease to 1/4.   -RE     LTG 1 Progress Not Met;Ongoing  -RE     LTG 2 Pt will be independant and complaint with wearing the Bains swell spot.  -RE     LTG 2 Progress Not Met  -RE     LTG 2 Progress Comments Pt has not purchased.  -RE     Time Calculation    OT Goal Re-Cert Due Date 01/10/18 "  -RE       User Key  (r) = Recorded By, (t) = Taken By, (c) = Cosigned By    Initials Name Provider Type    TAYA Graham Occupational Therapist                Therapy Education       11/28/17 8922          Therapy Education    Education Details Reviewed stretching  -RE      Given HEP  -RE      Program Reinforced  -RE      How Provided Verbal;Demonstration  -RE      Provided to Patient  -RE      Level of Understanding Teach back education performed;Verbalized  -RE        User Key  (r) = Recorded By, (t) = Taken By, (c) = Cosigned By    Initials Name Provider Type    TAYA Graham Occupational Therapist                  Time Calculation:   OT Start Time: 1030  OT Stop Time: 1100  OT Time Calculation (min): 30 min       Therapy Charges for Today     Code Description Service Date Service Provider Modifiers Qty    [id number]  OT MANUAL THERAPY EA 15 MIN 11/28/2017 TAYA Childs GO 2                      TAYA Childs  11/28/2017

## 2017-11-30 ENCOUNTER — HOSPITAL ENCOUNTER (OUTPATIENT)
Dept: OCCUPATIONAL THERAPY | Facility: HOSPITAL | Age: 62
Setting detail: THERAPIES SERIES
Discharge: HOME | End: 2017-11-30

## 2017-11-30 DIAGNOSIS — I89.0 LYMPHEDEMA IN ADULT PATIENT: Primary | ICD-10-CM

## 2017-11-30 PROCEDURE — 97140 MANUAL THERAPY 1/> REGIONS: CPT

## 2017-12-01 ENCOUNTER — HOSPITAL ENCOUNTER (OUTPATIENT)
Dept: OCCUPATIONAL THERAPY | Facility: HOSPITAL | Age: 62
Setting detail: THERAPIES SERIES
Discharge: HOME | End: 2017-12-01

## 2017-12-01 DIAGNOSIS — I89.0 LYMPHEDEMA IN ADULT PATIENT: Primary | ICD-10-CM

## 2017-12-01 PROCEDURE — 97140 MANUAL THERAPY 1/> REGIONS: CPT

## 2017-12-01 NOTE — THERAPY TREATMENT NOTE
Outpatient Occupational Therapy Lymphedema Treatment Note  [facility]     Patient Name: Lawrence Jefferson  : 1955  MRN: [id number]  Today's Date: 2017      Visit Date: 2017    There is no problem list on file for this patient.       Past Medical History:   Diagnosis Date   • Cancer    • Hypertension         Past Surgical History:   Procedure Laterality Date   • HERNIA REPAIR     • LARYNGECTOMY WITH NECK DISSECTION           Visit Dx:      ICD-10-CM ICD-9-CM   1. Lymphedema in adult patient I89.0 457.1             Lymphedema       17 1400 17 1600       Subjective Comments    Subjective Comments Late today  -RE No new complaints  -RE     Subjective Pain    Able to rate subjective pain? yes  -RE yes  -RE     Pre-Treatment Pain Level 0  -RE 0  -RE     Post-Treatment Pain Level 0  -RE 0  -RE     Manual Lymphatic Drainage    Manual Lymphatic Drainage initial sequence;opened regional lymph nodes;opened anastamoses;head/neck treatment  -RE initial sequence;opened regional lymph nodes;opened anastamoses;head/neck treatment  -RE     Initial Sequence full neck;cervical;supraclavicular;shoulder collectors  -RE full neck;cervical;supraclavicular;shoulder collectors  -RE     Cervical right;left  -RE right;left  -RE     Supraclavicular right;left  -RE right;left  -RE     Shoulder Collectors right;left  -RE right;left  -RE     Opened Regional Lymph Nodes axillary  -RE axillary  -RE     Axillary right;left  -RE right;left  -RE     Opened Anastamoses other opened anastamoses  -RE other opened anastamoses  -RE     Head/Neck Treatment pre-auricular nodes;post-auricular nodes;occipital nodes;sublingual nodes;full/complex MLD  -RE pre-auricular nodes;post-auricular nodes;occipital nodes;sublingual nodes;full/complex MLD  -RE     Manual Lymphatic Drainage Comments continue with stretching and scar massage  -RE Stretching and scar massage  -RE       User Key  (r) = Recorded By, (t) = Taken By, (c) = Cosigned By     Initials Name Provider Type    RE TAYA Childs Occupational Therapist                        OT Assessment/Plan       12/01/17 1447 11/30/17 1610    OT Assessment    Assessment Comments Submental edema has improved. oted continued edema aong R lateral neck.  Progressing well.  If Bains addresses the submental area then will d/c soon.  -RE Submental edema persists. Edema along the R and L lateral neck improved with MLD. Continues to have AM edema.  He can't sleep elevated very well and is not tolerating the elastomer all night. Will try the Bains.  -RE    OT Plan    OT Plan Comments continue  -RE continue  -RE      User Key  (r) = Recorded By, (t) = Taken By, (c) = Cosigned By    Initials Name Provider Type    RE TAYA Childs Occupational Therapist                                Therapy Education       12/01/17 1446 11/30/17 1610       Therapy Education    Education Details Pt to order bains  -RE reminded to order Bains  -RE     Given Edema management;Symptoms/condition management  -RE      Program Reinforced  -RE Reinforced  -RE     How Provided Verbal  -RE Verbal  -RE     Provided to Patient  -RE Patient  -RE     Level of Understanding Teach back education performed;Verbalized  -RE Teach back education performed;Verbalized  -RE       User Key  (r) = Recorded By, (t) = Taken By, (c) = Cosigned By    Initials Name Provider Type    RE TAYA Childs Occupational Therapist                  Time Calculation:   OT Start Time: 1000  OT Stop Time: 1030  OT Time Calculation (min): 30 min       Therapy Charges for Today     Code Description Service Date Service Provider Modifiers Qty    [id number] HC OT MANUAL THERAPY EA 15 MIN 12/1/2017 TAYA Childs GO 2                      TAYA Childs  12/1/2017

## 2017-12-05 ENCOUNTER — HOSPITAL ENCOUNTER (OUTPATIENT)
Dept: OCCUPATIONAL THERAPY | Facility: HOSPITAL | Age: 62
Setting detail: THERAPIES SERIES
Discharge: HOME | End: 2017-12-05

## 2017-12-05 DIAGNOSIS — I89.0 LYMPHEDEMA IN ADULT PATIENT: Primary | ICD-10-CM

## 2017-12-05 PROCEDURE — 97140 MANUAL THERAPY 1/> REGIONS: CPT

## 2017-12-05 NOTE — THERAPY TREATMENT NOTE
Outpatient Occupational Therapy Lymphedema Treatment Note  [facility]     Patient Name: Lawrence Jefferson  : 1955  MRN: [id number]  Today's Date: 2017      Visit Date: 2017    There is no problem list on file for this patient.       Past Medical History:   Diagnosis Date   • Cancer    • Hypertension         Past Surgical History:   Procedure Laterality Date   • HERNIA REPAIR     • LARYNGECTOMY WITH NECK DISSECTION           Visit Dx:      ICD-10-CM ICD-9-CM   1. Lymphedema in adult patient I89.0 457.1             Lymphedema       17 1600          Subjective Comments    Subjective Comments No new complaints  -RE      Subjective Pain    Able to rate subjective pain? yes  -RE      Pre-Treatment Pain Level 0  -RE      Post-Treatment Pain Level 0  -RE      Manual Lymphatic Drainage    Manual Lymphatic Drainage initial sequence;opened regional lymph nodes;opened anastamoses;head/neck treatment  -RE      Initial Sequence full neck;cervical;supraclavicular;shoulder collectors  -RE      Cervical right;left  -RE      Supraclavicular right;left  -RE      Shoulder Collectors right;left  -RE      Opened Regional Lymph Nodes axillary  -RE      Axillary right;left  -RE      Opened Anastamoses other opened anastamoses  -RE      Head/Neck Treatment pre-auricular nodes;post-auricular nodes;occipital nodes;sublingual nodes;full/complex MLD  -RE      Manual Lymphatic Drainage Comments scar massage  -RE        User Key  (r) = Recorded By, (t) = Taken By, (c) = Cosigned By    Initials Name Provider Type    RE Hilary Nunez OTR Occupational Therapist                        OT Assessment/Plan       17 1609       OT Assessment    Assessment Comments R neck with minimal to no palpable edema. L neck  is visible larger. The difference may be partialy due to scar tissue or is al edema.  minimal edema in the submental area today.    -RE     OT Plan    OT Plan Comments continue  -RE       User Key  (r) = Recorded By,  (t) = Taken By, (c) = Cosigned By    Initials Name Provider Type    TAYA Graham Occupational Therapist                                Therapy Education       12/05/17 1609          Therapy Education    Given Symptoms/condition management  -RE      Program Reinforced  -RE      How Provided Verbal  -RE      Provided to Patient  -RE      Level of Understanding Teach back education performed;Verbalized  -RE        User Key  (r) = Recorded By, (t) = Taken By, (c) = Cosigned By    Initials Name Provider Type    TAYA Graham Occupational Therapist                  Time Calculation:   OT Start Time: 1000  OT Stop Time: 1100  OT Time Calculation (min): 60 min       Therapy Charges for Today     Code Description Service Date Service Provider Modifiers Qty    [id number]  OT MANUAL THERAPY EA 15 MIN 12/5/2017 TAYA Childs GO 4                      TAYA Childs  12/5/2017

## 2017-12-07 ENCOUNTER — HOSPITAL ENCOUNTER (OUTPATIENT)
Dept: OCCUPATIONAL THERAPY | Facility: HOSPITAL | Age: 62
Setting detail: THERAPIES SERIES
Discharge: HOME | End: 2017-12-07

## 2017-12-07 DIAGNOSIS — I89.0 LYMPHEDEMA IN ADULT PATIENT: Primary | ICD-10-CM

## 2017-12-07 PROCEDURE — 97140 MANUAL THERAPY 1/> REGIONS: CPT

## 2017-12-07 NOTE — THERAPY TREATMENT NOTE
Outpatient Occupational Therapy Lymphedema Treatment Note  [facility]     Patient Name: Lawrence Jefferson  : 1955  MRN: [id number]  Today's Date: 2017      Visit Date: 2017    There is no problem list on file for this patient.       Past Medical History:   Diagnosis Date   • Cancer    • Hypertension         Past Surgical History:   Procedure Laterality Date   • HERNIA REPAIR     • LARYNGECTOMY WITH NECK DISSECTION           Visit Dx:      ICD-10-CM ICD-9-CM   1. Lymphedema in adult patient I89.0 457.1             Lymphedema       17 1500          Subjective Comments    Subjective Comments No new complaints  -RE      Subjective Pain    Able to rate subjective pain? yes  -RE      Pre-Treatment Pain Level 0  -RE      Post-Treatment Pain Level 0  -RE      Manual Lymphatic Drainage    Manual Lymphatic Drainage initial sequence;opened regional lymph nodes;opened anastamoses;head/neck treatment  -RE      Initial Sequence full neck;cervical;supraclavicular;shoulder collectors  -RE      Cervical right;left  -RE      Supraclavicular right;left  -RE      Shoulder Collectors right;left  -RE      Opened Regional Lymph Nodes axillary  -RE      Axillary right;left  -RE      Opened Anastamoses other opened anastamoses  -RE      Head/Neck Treatment pre-auricular nodes;post-auricular nodes;occipital nodes;sublingual nodes;full/complex MLD  -RE      Full/Complex MLD focus on R neck  -RE      Manual Lymphatic Drainage Comments scar massage  -RE      Compression/Skin Care    Compression/Skin Care Comments recommended he get a new neck compression support and the Bains  -RE        User Key  (r) = Recorded By, (t) = Taken By, (c) = Cosigned By    Initials Name Provider Type    RE TAYA Childs Occupational Therapist                        OT Assessment/Plan       17 1521       OT Assessment    Assessment Comments Has prgressed very well.  Some fibrosis on the right neck.  Will decrease frequency to 1 time  per week then d/c.    -RE     OT Plan    OT Plan Comments continue  -RE       User Key  (r) = Recorded By, (t) = Taken By, (c) = Cosigned By    Initials Name Provider Type    TAYA Graham Occupational Therapist                                Therapy Education       12/07/17 1521          Therapy Education    Education Details Reviewed HEP   -RE      Given Symptoms/condition management;HEP  -RE      Program Reinforced  -RE      How Provided Verbal  -RE      Provided to Patient  -RE      Level of Understanding Teach back education performed  -RE        User Key  (r) = Recorded By, (t) = Taken By, (c) = Cosigned By    Initials Name Provider Type    TAYA Graham Occupational Therapist                  Time Calculation:   OT Start Time: 1315  OT Stop Time: 1410  OT Time Calculation (min): 55 min       Therapy Charges for Today     Code Description Service Date Service Provider Modifiers Qty    [id number]  OT MANUAL THERAPY EA 15 MIN 12/7/2017 TAYA Childs GO 4                      TAYA Childs  12/7/2017

## 2017-12-08 ENCOUNTER — APPOINTMENT (OUTPATIENT)
Dept: OCCUPATIONAL THERAPY | Facility: HOSPITAL | Age: 62
End: 2017-12-08

## 2017-12-12 ENCOUNTER — HOSPITAL ENCOUNTER (OUTPATIENT)
Dept: OCCUPATIONAL THERAPY | Facility: HOSPITAL | Age: 62
Setting detail: THERAPIES SERIES
Discharge: HOME | End: 2017-12-12

## 2017-12-12 DIAGNOSIS — I89.0 LYMPHEDEMA IN ADULT PATIENT: Primary | ICD-10-CM

## 2017-12-12 PROCEDURE — 97140 MANUAL THERAPY 1/> REGIONS: CPT

## 2017-12-12 NOTE — THERAPY PROGRESS REPORT/RE-CERT
Outpatient Occupational Therapy Lymphedema Progress Note  [facility]     Patient Name: Lawrence Jefferson  : 1955  MRN: [id number]  Today's Date: 2017      Visit Date: 2017    There is no problem list on file for this patient.       Past Medical History:   Diagnosis Date   • Cancer    • Hypertension         Past Surgical History:   Procedure Laterality Date   • HERNIA REPAIR     • LARYNGECTOMY WITH NECK DISSECTION           Visit Dx:      ICD-10-CM ICD-9-CM   1. Lymphedema in adult patient I89.0 457.1             Lymphedema       17 1500          Subjective Comments    Subjective Comments no new complaints (pt was late today)   -RE      Subjective Pain    Able to rate subjective pain? yes  -RE      Pre-Treatment Pain Level 0  -RE      Post-Treatment Pain Level 0  -RE      Manual Lymphatic Drainage    Manual Lymphatic Drainage initial sequence;opened regional lymph nodes;opened anastamoses  -RE      Initial Sequence short neck;cervical;supraclavicular  -RE      Cervical right;left  -RE      Supraclavicular right;left  -RE      Shoulder Collectors right;left  -RE      Opened Regional Lymph Nodes axillary  -RE      Axillary right;left  -RE      Opened Anastamoses other opened anastamoses  -RE      Head/Neck Treatment --  -RE      Manual Lymphatic Drainage Comments minimal MLD todat due to time limitations.  Focused scar massage and stretching.    -RE        User Key  (r) = Recorded By, (t) = Taken By, (c) = Cosigned By    Initials Name Provider Type    RE TAYA Childs Occupational Therapist                        OT Assessment/Plan       17 1521       OT Assessment    Functional Limitations Performance in work activities  -RE     Impairments Integumentary integrity;Impaired lymphatic circulation;Posture;Edema;Impaired flexibility  -RE     Assessment Comments Noted decreased scar mobility bilateral neck but decreased edema.  Treatment focused on the scar and muscle tightness.   -RE     OT  "Diagnosis head and neck lymphedema  -RE     OT Rehab Potential Excellent  -RE     Patient/caregiver participated in establishment of treatment plan and goals Yes  -RE     Patient would benefit from skilled therapy intervention Yes  -RE     OT Plan    OT Frequency 1x/week  -RE     Predicted Duration of Therapy Intervention (days/wks) 2 weeks   -RE     Planned CPT's? OT SELF CARE/MGMT/TRAIN 15 MIN: 45888;OT THER ACT EA 15 MIN: 43200XY;OT THER PROC EA 15 MIN: [id number];OT MANUAL THERAPY EA 15 MIN: 58836  -RE     Planned Therapy Interventions (Optional Details) manual therapy techniques;postural re-education;patient/family education;ROM (Range of Motion);home exercise program  -RE     OT Plan Comments Plan on d/c next week unless he encounters problems  -RE       User Key  (r) = Recorded By, (t) = Taken By, (c) = Cosigned By    Initials Name Provider Type    RE Hilary Nunez OTR Occupational Therapist                            OT Goals       12/12/17 1500       OT Short Term Goals    STG Date to Achieve 12/26/17  -RE     STG 1 Patient will demonstrate proper awareness of What is Lymphedema and \"Do's & Don'ts\" for improved prevention, management, care of symptoms and ease of transition to self-care of condition.   -RE     STG 1 Progress Met  -RE     STG 2 Patient independent and compliant with self-massage techniques with spouse/family member as needed for improved lymphatic drainage, decreased edema/symptoms, decreased risk of infection and improved transition to self-care of condition.   -RE     STG 2 Progress Met  -RE     STG 3 Patient independent and compliant with initial home exercise program focused on diaphragmatic breathing, range of motion, flexibility to decrease edema and improve lymphatic flow for decreased edema and decreased risk of infection.   -RE     STG 3 Progress Met  -RE     STG 4 Patient will report decreased fullness in the neck and face.   -RE     STG 4 Progress Met  -RE     Long Term Goals    LTG " Date to Achieve 12/26/17  -RE     LTG 1 Edema in the submental area will decrease to 1/4.   -RE     LTG 1 Progress Ongoing  -RE     LTG 2 Pt will be independant and complaint with wearing the Bains swell spot.  -RE     LTG 2 Progress Not Met  -RE     LTG 2 Progress Comments Pt has not purchased.  -RE     Time Calculation    OT Goal Re-Cert Due Date 01/10/18  -RE       User Key  (r) = Recorded By, (t) = Taken By, (c) = Cosigned By    Initials Name Provider Type    RE TAYA Childs Occupational Therapist                           Time Calculation:   OT Start Time: 1025  OT Stop Time: 1105  OT Time Calculation (min): 40 min       Therapy Charges for Today     Code Description Service Date Service Provider Modifiers Qty    [id number]  OT MANUAL THERAPY EA 15 MIN 12/12/2017 TAYA Childs GO 3                      TAYA Childs  12/12/2017

## 2017-12-19 ENCOUNTER — HOSPITAL ENCOUNTER (OUTPATIENT)
Dept: OCCUPATIONAL THERAPY | Facility: HOSPITAL | Age: 62
Setting detail: THERAPIES SERIES
Discharge: HOME | End: 2017-12-19

## 2017-12-19 DIAGNOSIS — I89.0 LYMPHEDEMA IN ADULT PATIENT: Primary | ICD-10-CM

## 2017-12-19 PROCEDURE — 97140 MANUAL THERAPY 1/> REGIONS: CPT

## 2017-12-19 NOTE — THERAPY PROGRESS REPORT/RE-CERT
Outpatient Occupational Therapy Lymphedema Progress Note  [facility]     Patient Name: Lawrence Jefferson  : 1955  MRN: [id number]  Today's Date: 2017      Visit Date: 2017    There is no problem list on file for this patient.       Past Medical History:   Diagnosis Date   • Cancer    • Hypertension         Past Surgical History:   Procedure Laterality Date   • HERNIA REPAIR     • LARYNGECTOMY WITH NECK DISSECTION           Visit Dx:      ICD-10-CM ICD-9-CM   1. Lymphedema in adult patient I89.0 457.1             Lymphedema       17 1300          Subjective Comments    Subjective Comments Said Karen(his SLP) wants him to continue treatment to improve the fit with his communication device.   -RE      Subjective Pain    Able to rate subjective pain? yes  -RE      Pre-Treatment Pain Level 0  -RE      Post-Treatment Pain Level 0  -RE      Lymphedema Edema Assessment    Pitting Edema + 1/4 (+1 of 4);Mild;Moderate  -RE      Skin Changes/Observations    Skin Observations Comment L side is wrinkled indicating a decrease in edema , L side is thicker with no wrinkles.  -RE      Lymphedema Sensation    Lymphedema Sensation Comments Pt notes that sensationis improving significantly on the R side where it had been numb.  -RE      Manual Lymphatic Drainage    Manual Lymphatic Drainage initial sequence;opened regional lymph nodes;opened anastamoses  -RE      Initial Sequence short neck;cervical;supraclavicular  -RE      Cervical right;left  -RE      Supraclavicular right;left  -RE      Shoulder Collectors right;left  -RE      Opened Regional Lymph Nodes axillary  -RE      Axillary right;left  -RE      Opened Anastamoses other opened anastamoses  -RE      Head/Neck Treatment pre-auricular nodes;post-auricular nodes;occipital nodes;sublingual nodes;full/complex MLD  -RE      Manual Lymphatic Drainage Comments Scar massage and manual stretching to scalenes  -RE      Compression/Skin Care    Compression/Skin Care  Comments wearing neck support  -RE        User Key  (r) = Recorded By, (t) = Taken By, (c) = Cosigned By    Initials Name Provider Type    RE Hilary Nunez OTR Occupational Therapist                        OT Assessment/Plan       12/19/17 8263       OT Assessment    Functional Limitations Performance in work activities  -RE     Impairments Edema;Sensation;Range of motion;Impaired lymphatic circulation;Integumentary integrity;Impaired sensory integrity  -RE     Assessment Comments Pt has progressed well.  Edema persists on the right side of his neck and muscle and tissue tightness persist despite stretching at home.  His speech therapist in [place] wants further treatment to maximize his reduction to facilitate a better fit with his speech device.  At this point his edema fluctuates and he is not able to keep it under good control with his compression neck wear. I will continue to see for soft tissue stretching and scar massage as well as MLD to control and decrease his edema.     -RE     Please refer to paper survey for additional self-reported information No  -RE     OT Diagnosis Head and neck lymphedema  -RE     OT Rehab Potential Good  -RE     Patient/caregiver participated in establishment of treatment plan and goals Yes  -RE     Patient would benefit from skilled therapy intervention Yes  -RE     OT Plan    OT Frequency 2x/week;3x/week  -RE     Predicted Duration of Therapy Intervention (days/wks) 2-4 weeks  -RE     Planned CPT's? OT SELF CARE/MGMT/TRAIN 15 MIN: 67950;OT THER PROC EA 15 MIN: 28934LQ;OT THER ACT EA 15 MIN: [id number];OT MANUAL THERAPY EA 15 MIN: 79074  -RE     Planned Therapy Interventions (Optional Details) manual therapy techniques;stretching;postural re-education;patient/family education;other (see comments)   compression and skin care  -RE     OT Plan Comments Will continue to see to decrease edema and prevent flucuations which affect his speech.   -RE       User Key  (r) = Recorded By, (t)  "= Taken By, (c) = Cosigned By    Initials Name Provider Type    TAYA Graham Occupational Therapist                            OT Goals       12/19/17 1300       OT Short Term Goals    STG Date to Achieve 12/26/17  -RE     STG 1 Patient will demonstrate proper awareness of What is Lymphedema and \"Do's & Don'ts\" for improved prevention, management, care of symptoms and ease of transition to self-care of condition.   -RE     STG 1 Progress Met  -RE     STG 2 Patient independent and compliant with self-massage techniques with spouse/family member as needed for improved lymphatic drainage, decreased edema/symptoms, decreased risk of infection and improved transition to self-care of condition.   -RE     STG 2 Progress Met  -RE     STG 3 Patient independent and compliant with initial home exercise program focused on diaphragmatic breathing, range of motion, flexibility to decrease edema and improve lymphatic flow for decreased edema and decreased risk of infection.   -RE     STG 3 Progress Met  -RE     STG 4 Patient will report decreased fullness in the neck and face.   -RE     STG 4 Progress Met  -RE     Long Term Goals    LTG Date to Achieve 12/26/17  -RE     LTG 1 Edema in the submental area will decrease to 1/4.   -RE     LTG 1 Progress Ongoing  -RE     LTG 1 Progress Comments Edema increases to 2+and then back to 1+  -RE     LTG 2 Pt will be independant and complaint with wearing the Bains swell spot.  -RE     LTG 2 Progress Not Met  -RE     Time Calculation    OT Goal Re-Cert Due Date 01/10/18  -RE       User Key  (r) = Recorded By, (t) = Taken By, (c) = Cosigned By    Initials Name Provider Type    TAYA Graham Occupational Therapist          Therapy Education  Given: Edema management  Program: Reinforced  How Provided: Verbal  Provided to: Patient  Level of Understanding: Teach back education performed, Verbalized                Time Calculation:   OT Start Time: 1000  OT Stop Time: 1100  OT " Time Calculation (min): 60 min       Therapy Charges for Today     Code Description Service Date Service Provider Modifiers Qty    [id number]  OT MANUAL THERAPY EA 15 MIN 12/19/2017 TAYA Childs GO 4                      TAYA Childs  12/19/2017

## 2017-12-21 ENCOUNTER — APPOINTMENT (OUTPATIENT)
Dept: OCCUPATIONAL THERAPY | Facility: HOSPITAL | Age: 62
End: 2017-12-21

## 2018-01-04 ENCOUNTER — APPOINTMENT (OUTPATIENT)
Dept: OCCUPATIONAL THERAPY | Facility: HOSPITAL | Age: 63
End: 2018-01-04

## 2018-01-09 ENCOUNTER — APPOINTMENT (OUTPATIENT)
Dept: OCCUPATIONAL THERAPY | Facility: HOSPITAL | Age: 63
End: 2018-01-09

## 2018-01-11 ENCOUNTER — HOSPITAL ENCOUNTER (OUTPATIENT)
Dept: OCCUPATIONAL THERAPY | Facility: HOSPITAL | Age: 63
Setting detail: THERAPIES SERIES
Discharge: HOME | End: 2018-01-11

## 2018-01-11 DIAGNOSIS — I89.0 LYMPHEDEMA IN ADULT PATIENT: Primary | ICD-10-CM

## 2018-01-11 PROCEDURE — 97140 MANUAL THERAPY 1/> REGIONS: CPT

## 2018-01-11 NOTE — THERAPY PROGRESS REPORT/RE-CERT
Outpatient Occupational Therapy Lymphedema Progress Note  [facility]     Patient Name: Lawrence Jefferson  : 1955  MRN: [id number]  Today's Date: 2018      Visit Date: 2018    There is no problem list on file for this patient.       Past Medical History:   Diagnosis Date   • Cancer    • Hypertension         Past Surgical History:   Procedure Laterality Date   • HERNIA REPAIR     • LARYNGECTOMY WITH NECK DISSECTION           Visit Dx:      ICD-10-CM ICD-9-CM   1. Lymphedema in adult patient I89.0 457.1             Lymphedema       18 1600          Subjective Comments    Subjective Comments Having skin issues around his stoma from adhesive.  -RE      Subjective Pain    Able to rate subjective pain? yes  -RE      Pre-Treatment Pain Level 0  -RE      Post-Treatment Pain Level 0  -RE      Lymphedema Edema Assessment    Pitting Edema + 1/4 (+1 of 4);Mild  -RE      Edema Assessment Comment mild edema in the submental area.  -RE      Skin Changes/Observations    Skin Observations Comment Skin is red and shiny around the stoma  -RE      Manual Lymphatic Drainage    Manual Lymphatic Drainage initial sequence;opened regional lymph nodes;opened anastamoses  -RE      Initial Sequence short neck;cervical;supraclavicular  -RE      Cervical right;left  -RE      Supraclavicular right;left  -RE      Shoulder Collectors right;left  -RE      Opened Regional Lymph Nodes axillary  -RE      Axillary right;left  -RE      Opened Anastamoses other opened anastamoses  -RE      Head/Neck Treatment pre-auricular nodes;post-auricular nodes;occipital nodes;sublingual nodes;full/complex MLD  -RE      Manual Lymphatic Drainage Comments Scar massage and stretching  -RE        User Key  (r) = Recorded By, (t) = Taken By, (c) = Cosigned By    Initials Name Provider Type    RE TAYA Childs Occupational Therapist                        OT Assessment/Plan       18 7488       OT Assessment    Functional Limitations  "Performance in work activities;Limitation in home management  -RE     Impairments Edema;Impaired lymphatic circulation;Impaired sensory integrity;Range of motion;Integumentary integrity  -RE     Assessment Comments Returns today after the holiday break with significant soft tissue tightness which is causing forward shoulders, scapular elevation, and decreased neck AROM.  Mild edema is noted in the submental area.   -RE     Please refer to paper survey for additional self-reported information No  -RE     OT Diagnosis Head and neck lymphedema  -RE     OT Rehab Potential Good  -RE     Patient/caregiver participated in establishment of treatment plan and goals Yes  -RE     Patient would benefit from skilled therapy intervention Yes  -RE     OT Plan    OT Frequency 2x/week;3x/week  -RE     Predicted Duration of Therapy Intervention (days/wks) 2-4 weeks  -RE     Planned CPT's? OT SELF CARE/MGMT/TRAIN 15 MIN: 65578;OT THER PROC EA 15 MIN: 62438XP;OT THER ACT EA 15 MIN: 62603FL;OT MANUAL THERAPY EA 15 MIN: 17845  -RE     Planned Therapy Interventions (Optional Details) home exercise program;manual therapy techniques;stretching;stair training;patient/family education  -RE       User Key  (r) = Recorded By, (t) = Taken By, (c) = Cosigned By    Initials Name Provider Type    RE Hilary Nunez OTR Occupational Therapist                            OT Goals       01/11/18 1600       OT Short Term Goals    STG Date to Achieve 01/25/18  -RE     STG 1 Patient will demonstrate proper awareness of What is Lymphedema and \"Do's & Don'ts\" for improved prevention, management, care of symptoms and ease of transition to self-care of condition.   -RE     STG 1 Progress Met  -RE     STG 2 Patient independent and compliant with self-massage techniques with spouse/family member as needed for improved lymphatic drainage, decreased edema/symptoms, decreased risk of infection and improved transition to self-care of condition.   -RE     STG 2 " Progress Partially Met  -RE     STG 2 Progress Comments Pt is not consistent  -RE     STG 3 Patient independent and compliant with initial home exercise program focused on diaphragmatic breathing, range of motion, flexibility to decrease edema and improve lymphatic flow for decreased edema and decreased risk of infection.   -RE     STG 3 Progress Partially Met  -RE     STG 3 Progress Comments Not consistent  -RE     STG 4 Patient will report decreased fullness in the neck and face.   -RE     STG 4 Progress Met  -RE     Long Term Goals    LTG Date to Achieve 02/11/18  -RE     LTG 1 Edema in the submental area will decrease to 1/4.   -RE     LTG 1 Progress Ongoing  -RE     LTG 2 Pt will be independant and complaint with wearing the Apozy swell spot.  -RE     LTG 2 Progress Not Met  -RE     Time Calculation    OT Goal Re-Cert Due Date 04/11/18  -RE       User Key  (r) = Recorded By, (t) = Taken By, (c) = Cosigned By    Initials Name Provider Type    RE TAYA Childs Occupational Therapist          Therapy Education  Given: HEP  Program: Reinforced  How Provided: Verbal, Demonstration  Provided to: Patient  Level of Understanding: Teach back education performed, Verbalized                Time Calculation:   OT Start Time: 1000  OT Stop Time: 1100  OT Time Calculation (min): 60 min       Therapy Charges for Today     Code Description Service Date Service Provider Modifiers Qty    [id number] HC OT MANUAL THERAPY EA 15 MIN 1/11/2018 TAYA Childs GO 4                      TAYA Childs  1/11/2018

## 2018-01-12 ENCOUNTER — APPOINTMENT (OUTPATIENT)
Dept: OCCUPATIONAL THERAPY | Facility: HOSPITAL | Age: 63
End: 2018-01-12

## 2018-01-16 ENCOUNTER — HOSPITAL ENCOUNTER (OUTPATIENT)
Dept: OCCUPATIONAL THERAPY | Facility: HOSPITAL | Age: 63
Setting detail: THERAPIES SERIES
Discharge: HOME | End: 2018-01-16

## 2018-01-16 DIAGNOSIS — I89.0 LYMPHEDEMA IN ADULT PATIENT: Primary | ICD-10-CM

## 2018-01-16 PROCEDURE — 97535 SELF CARE MNGMENT TRAINING: CPT

## 2018-01-16 NOTE — THERAPY TREATMENT NOTE
Outpatient Occupational Therapy Lymphedema Treatment Note  [facility]     Patient Name: Lawrence Jefferson  : 1955  MRN: [id number]  Today's Date: 2018      Visit Date: 2018    There is no problem list on file for this patient.       Past Medical History:   Diagnosis Date   • Cancer    • Hypertension         Past Surgical History:   Procedure Laterality Date   • HERNIA REPAIR     • LARYNGECTOMY WITH NECK DISSECTION           Visit Dx:      ICD-10-CM ICD-9-CM   1. Lymphedema in adult patient I89.0 457.1             Lymphedema       18 1000          Subjective Comments    Subjective Comments Reports sudden onset of neck swelling, pain and redness which resolved over the next few days.   -RE      Subjective Pain    Able to rate subjective pain? yes  -RE      Pre-Treatment Pain Level 2  -RE      Post-Treatment Pain Level 2  -RE      Skin Changes/Observations    Skin Observations Comment radiated skin is peeling  -RE      Manual Lymphatic Drainage    Manual Lymphatic Drainage Comments No MLD today due to the possibility of infection. I recommended he calll MD. He said he would e-mail his SLP in [facility].    -RE        User Key  (r) = Recorded By, (t) = Taken By, (c) = Cosigned By    Initials Name Provider Type    RE TAYA Childs Occupational Therapist                                      Therapy Education  Education Details: Discussed symptoms which would indicate the need for immediate medical treatment.   Given: Other (comment)  Program: New  How Provided: Verbal  Provided to: Patient  Level of Understanding: Teach back education performed, Verbalized                Time Calculation:   OT Start Time: 1015  OT Stop Time: 1045  OT Time Calculation (min): 30 min  OT Non-Billable Time (min): 15 min       Therapy Charges for Today     Code Description Service Date Service Provider Modifiers Qty    80930983766  OT SELF CARE/MGMT/TRAIN EA 15 MIN 2018 TAYA Childs GO 1                       Hilary Nunez, OTR  1/16/2018

## 2018-01-18 ENCOUNTER — HOSPITAL ENCOUNTER (OUTPATIENT)
Dept: OCCUPATIONAL THERAPY | Facility: HOSPITAL | Age: 63
Setting detail: THERAPIES SERIES
Discharge: HOME | End: 2018-01-18

## 2018-01-18 DIAGNOSIS — I89.0 LYMPHEDEMA IN ADULT PATIENT: Primary | ICD-10-CM

## 2018-01-18 PROCEDURE — 97140 MANUAL THERAPY 1/> REGIONS: CPT

## 2018-01-18 NOTE — THERAPY TREATMENT NOTE
Outpatient Occupational Therapy Lymphedema Treatment Note  [facility]     Patient Name: Lawrence Jefferson  : 1955  MRN: [id number]  Today's Date: 2018      Visit Date: 2018    There is no problem list on file for this patient.       Past Medical History:   Diagnosis Date   • Cancer    • Hypertension         Past Surgical History:   Procedure Laterality Date   • HERNIA REPAIR     • LARYNGECTOMY WITH NECK DISSECTION           Visit Dx:      ICD-10-CM ICD-9-CM   1. Lymphedema in adult patient I89.0 457.1             Lymphedema       18 1300          Subjective Comments    Subjective Comments Reports no new syptoms. Did not contact his SLP.   -RE      Subjective Pain    Able to rate subjective pain? yes  -RE      Pre-Treatment Pain Level 0  -RE      Post-Treatment Pain Level 0  -RE      Skin Changes/Observations    Skin Observations Comment redness resolved   -RE      Manual Lymphatic Drainage    Manual Lymphatic Drainage initial sequence;opened regional lymph nodes;opened anastamoses  -RE      Initial Sequence short neck;cervical;supraclavicular  -RE      Cervical right;left  -RE      Supraclavicular right;left  -RE      Shoulder Collectors right;left  -RE      Opened Regional Lymph Nodes axillary  -RE      Axillary right;left  -RE      Opened Anastamoses other opened anastamoses  -RE      Head/Neck Treatment pre-auricular nodes;post-auricular nodes;occipital nodes;sublingual nodes;full/complex MLD  -RE      Manual Lymphatic Drainage Comments Scar massage to neck  -RE        User Key  (r) = Recorded By, (t) = Taken By, (c) = Cosigned By    Initials Name Provider Type    RE TAYA Childs Occupational Therapist                        OT Assessment/Plan       18 1315       OT Assessment    Assessment Comments Redness and mild edema resolved.  Scars increased in mobility with massage and stretching. Minimal edema today.    -RE     OT Plan    OT Plan Comments Pt should be ready to transition  to the CARE program or other community program soon.   -RE       User Key  (r) = Recorded By, (t) = Taken By, (c) = Cosigned By    Initials Name Provider Type    RE TAYA Childs Occupational Therapist                          Therapy Education  Given: HEP  Program: Reinforced  How Provided: Verbal, Demonstration  Provided to: Patient  Level of Understanding: Teach back education performed, Verbalized                Time Calculation:   OT Start Time: 1015  OT Stop Time: 1115  OT Time Calculation (min): 60 min       Therapy Charges for Today     Code Description Service Date Service Provider Modifiers Qty    [id number]  OT MANUAL THERAPY EA 15 MIN 1/18/2018 TAYA Childs GO 4                      TAYA Cihlds  1/18/2018

## 2018-01-19 ENCOUNTER — APPOINTMENT (OUTPATIENT)
Dept: OCCUPATIONAL THERAPY | Facility: HOSPITAL | Age: 63
End: 2018-01-19

## 2018-01-23 ENCOUNTER — APPOINTMENT (OUTPATIENT)
Dept: OCCUPATIONAL THERAPY | Facility: HOSPITAL | Age: 63
End: 2018-01-23

## 2018-01-26 ENCOUNTER — HOSPITAL ENCOUNTER (OUTPATIENT)
Dept: OCCUPATIONAL THERAPY | Facility: HOSPITAL | Age: 63
Setting detail: THERAPIES SERIES
Discharge: HOME | End: 2018-01-26

## 2018-01-26 DIAGNOSIS — I89.0 LYMPHEDEMA IN ADULT PATIENT: Primary | ICD-10-CM

## 2018-01-26 PROCEDURE — 97140 MANUAL THERAPY 1/> REGIONS: CPT

## 2018-02-02 ENCOUNTER — HOSPITAL ENCOUNTER (OUTPATIENT)
Dept: OCCUPATIONAL THERAPY | Facility: HOSPITAL | Age: 63
Setting detail: THERAPIES SERIES
Discharge: HOME | End: 2018-02-02

## 2018-02-02 DIAGNOSIS — I89.0 LYMPHEDEMA IN ADULT PATIENT: Primary | ICD-10-CM

## 2018-02-02 PROCEDURE — 97140 MANUAL THERAPY 1/> REGIONS: CPT

## 2018-02-02 NOTE — THERAPY TREATMENT NOTE
Outpatient Occupational Therapy Lymphedema Treatment Note  [facility]     Patient Name: Lawrence Jefferson  : 1955  MRN: [id number]  Today's Date: 2018      Visit Date: 2018    There is no problem list on file for this patient.       Past Medical History:   Diagnosis Date   • Cancer    • Hypertension         Past Surgical History:   Procedure Laterality Date   • HERNIA REPAIR     • LARYNGECTOMY WITH NECK DISSECTION           Visit Dx:      ICD-10-CM ICD-9-CM   1. Lymphedema in adult patient I89.0 457.1             Lymphedema       18 1100          Subjective Comments    Subjective Comments No new complaints  -RE      Subjective Pain    Able to rate subjective pain? yes  -RE      Pre-Treatment Pain Level 0  -RE      Post-Treatment Pain Level 0  -RE      Manual Lymphatic Drainage    Manual Lymphatic Drainage initial sequence;opened regional lymph nodes;opened anastamoses;head/neck treatment  -RE      Initial Sequence short neck;cervical;supraclavicular  -RE      Cervical right;left  -RE      Supraclavicular right;left  -RE      Shoulder Collectors right;left  -RE      Opened Regional Lymph Nodes axillary  -RE      Axillary right;left  -RE      Opened Anastamoses other opened anastamoses  -RE      Head/Neck Treatment pre-auricular nodes;post-auricular nodes;occipital nodes;sublingual nodes;full/complex MLD  -RE      Manual Lymphatic Drainage Comments scar massage on the R  -RE        User Key  (r) = Recorded By, (t) = Taken By, (c) = Cosigned By    Initials Name Provider Type    RE Hilary Nunez OTR Occupational Therapist                        OT Assessment/Plan       18 1148       OT Assessment    Assessment Comments Submental edema noted. This was not in the radiation field but appears trapped above it.  Respond to MLD but not with complete resolution. The graft area is wrinkled indicating a significant reduction. The pt reports that edema is worse in AM and significantly improves by  evening. He is not yet wearing the Bains Swell spot which may help the AM edema is he tolerates it all night.      -RE     OT Plan    OT Plan Comments Continue.  -RE       User Key  (r) = Recorded By, (t) = Taken By, (c) = Cosigned By    Initials Name Provider Type    TAYA Graham Occupational Therapist                          Therapy Education  Education Details: Continue stretching and perform scar massage.  Given: HEP  Program: Reinforced  How Provided: Verbal  Provided to: Patient  Level of Understanding: Teach back education performed, Verbalized                Time Calculation:   OT Start Time: 1005  OT Stop Time: 1105  OT Time Calculation (min): 60 min       Therapy Charges for Today     Code Description Service Date Service Provider Modifiers Qty    36080437042 HC OT MANUAL THERAPY EA 15 MIN 2/2/2018 TAYA Childs GO 4                      TAYA Childs  2/2/2018

## 2018-02-09 ENCOUNTER — APPOINTMENT (OUTPATIENT)
Dept: OCCUPATIONAL THERAPY | Facility: HOSPITAL | Age: 63
End: 2018-02-09

## 2018-02-16 ENCOUNTER — HOSPITAL ENCOUNTER (OUTPATIENT)
Dept: OCCUPATIONAL THERAPY | Facility: HOSPITAL | Age: 63
Setting detail: THERAPIES SERIES
Discharge: HOME | End: 2018-02-16

## 2018-02-16 DIAGNOSIS — I89.0 LYMPHEDEMA IN ADULT PATIENT: Primary | ICD-10-CM

## 2018-02-16 PROCEDURE — 97140 MANUAL THERAPY 1/> REGIONS: CPT

## 2018-02-16 NOTE — THERAPY PROGRESS REPORT/RE-CERT
Outpatient Occupational Therapy Lymphedema Progress Note  [facility]     Patient Name: Lawrnece Jefferson  : 1955  MRN: [id number]  Today's Date: 2018      Visit Date: 2018    There is no problem list on file for this patient.       Past Medical History:   Diagnosis Date   • Cancer    • Hypertension         Past Surgical History:   Procedure Laterality Date   • HERNIA REPAIR     • LARYNGECTOMY WITH NECK DISSECTION           Visit Dx:      ICD-10-CM ICD-9-CM   1. Lymphedema in adult patient I89.0 457.1             Lymphedema       18 1400          Subjective Comments    Subjective Comments Daily symptoms are  improving with less dramatic increases.  -RE      Subjective Pain    Able to rate subjective pain? yes  -RE      Pre-Treatment Pain Level 2  -RE      Post-Treatment Pain Level 2  -RE      Subjective Pain Comment Some new r sided neck pain.  -RE      Manual Lymphatic Drainage    Manual Lymphatic Drainage initial sequence;opened regional lymph nodes;opened anastamoses;head/neck treatment  -RE      Initial Sequence short neck;cervical;supraclavicular  -RE      Cervical right;left  -RE      Supraclavicular right;left  -RE      Shoulder Collectors right;left  -RE      Opened Regional Lymph Nodes axillary  -RE      Axillary right;left  -RE      Opened Anastamoses other opened anastamoses  -RE      Head/Neck Treatment pre-auricular nodes;post-auricular nodes;occipital nodes;sublingual nodes;full/complex MLD  -RE        User Key  (r) = Recorded By, (t) = Taken By, (c) = Cosigned By    Initials Name Provider Type    RE Hilary Nunez OTR Occupational Therapist                        OT Assessment/Plan       18 1247       OT Assessment    Assessment Comments continues with submental edema and also along the ankles of the jaw.  This responds to MLD. Overall edema is mild. It does increase at night and decrease during the day. Will decide on further tx after he sees his surgeon next week.   -RE      "OT Plan    OT Plan Comments decide on further treatment after he sees his surgeon  -RE       User Key  (r) = Recorded By, (t) = Taken By, (c) = Cosigned By    Initials Name Provider Type    TAYA Graham Occupational Therapist                            OT Goals       02/16/18 1400       OT Short Term Goals    STG Date to Achieve 03/02/18  -RE     STG 1 Patient will demonstrate proper awareness of What is Lymphedema and \"Do's & Don'ts\" for improved prevention, management, care of symptoms and ease of transition to self-care of condition.   -RE     STG 1 Progress Met  -RE     STG 2 Patient independent and compliant with self-massage techniques with spouse/family member as needed for improved lymphatic drainage, decreased edema/symptoms, decreased risk of infection and improved transition to self-care of condition.   -RE     STG 2 Progress Partially Met  -RE     STG 2 Progress Comments Is inconsistent.  -RE     STG 3 Patient independent and compliant with initial home exercise program focused on diaphragmatic breathing, range of motion, flexibility to decrease edema and improve lymphatic flow for decreased edema and decreased risk of infection.   -RE     STG 3 Progress Partially Met  -RE     STG 3 Progress Comments not consistent yet  -RE     STG 4 Patient will report decreased fullness in the neck and face.   -RE     STG 4 Progress Met;Ongoing  -RE     Long Term Goals    LTG Date to Achieve 03/16/18  -RE     LTG 1 Edema in the submental area will decrease to 1/4.   -RE     LTG 1 Progress Ongoing  -RE     LTG 1 Progress Comments 1+ today  -RE     LTG 2 Pt will be independant and complaint with wearing the Bains swell spot.  -RE     LTG 2 Progress Not Met;Ongoing  -RE     Time Calculation    OT Goal Re-Cert Due Date 04/11/18  -RE       User Key  (r) = Recorded By, (t) = Taken By, (c) = Cosigned By    Initials Name Provider Type    TAYA Graham Occupational Therapist          Therapy " Education  Education Details: perform scar massage and stretching daily.  Program: Reinforced  How Provided: Verbal, Demonstration  Provided to: Patient  Level of Understanding: Teach back education performed, Verbalized                Time Calculation:   OT Start Time: 1015  OT Stop Time: 1100  OT Time Calculation (min): 45 min       Therapy Charges for Today     Code Description Service Date Service Provider Modifiers Qty    [id number]  OT MANUAL THERAPY EA 15 MIN 2/16/2018 TAYA Childs GO 3                      TAYA Childs  2/16/2018

## 2018-02-23 ENCOUNTER — APPOINTMENT (OUTPATIENT)
Dept: OCCUPATIONAL THERAPY | Facility: HOSPITAL | Age: 63
End: 2018-02-23

## 2018-03-01 ENCOUNTER — HOSPITAL ENCOUNTER (OUTPATIENT)
Dept: OCCUPATIONAL THERAPY | Facility: HOSPITAL | Age: 63
Setting detail: THERAPIES SERIES
Discharge: HOME | End: 2018-03-01

## 2018-03-01 DIAGNOSIS — I89.0 LYMPHEDEMA IN ADULT PATIENT: Primary | ICD-10-CM

## 2018-03-01 PROCEDURE — 97140 MANUAL THERAPY 1/> REGIONS: CPT

## 2020-05-22 NOTE — THERAPY TREATMENT NOTE
Outpatient Occupational Therapy Lymphedema Treatment Note  [facility]     Patient Name: Lawrence Jefferson  : 1955  MRN: [id number]  Today's Date: 2018      Visit Date: 2018    There is no problem list on file for this patient.       Past Medical History:   Diagnosis Date   • Cancer    • Hypertension         Past Surgical History:   Procedure Laterality Date   • HERNIA REPAIR     • LARYNGECTOMY WITH NECK DISSECTION           Visit Dx:      ICD-10-CM ICD-9-CM   1. Lymphedema in adult patient I89.0 457.1             Lymphedema       18 1300          Subjective Comments    Subjective Comments will need a revision of his skin graft to decrease excess tissue that blocks his stoma.  -RE      Subjective Pain    Able to rate subjective pain? yes  -RE      Pre-Treatment Pain Level 0  -RE      Post-Treatment Pain Level 0  -RE      Skin Changes/Observations    Skin Observations Comment No redness noted  -RE      Manual Lymphatic Drainage    Manual Lymphatic Drainage initial sequence;opened regional lymph nodes;opened anastamoses;head/neck treatment  -RE      Initial Sequence short neck;cervical;supraclavicular  -RE      Cervical right;left  -RE      Supraclavicular right;left  -RE      Shoulder Collectors right;left  -RE      Opened Regional Lymph Nodes axillary  -RE      Axillary right;left  -RE      Opened Anastamoses other opened anastamoses  -RE      Head/Neck Treatment pre-auricular nodes;post-auricular nodes;occipital nodes;sublingual nodes;full/complex MLD  -RE      Compression/Skin Care    Compression/Skin Care Comments Pt to order matta  -RE        User Key  (r) = Recorded By, (t) = Taken By, (c) = Cosigned By    Initials Name Provider Type    RE TAYA Childs Occupational Therapist                        OT Assessment/Plan       18 1310       OT Assessment    Assessment Comments Increased submental edema today. He is going to have these and will need targeted compression to address  Wife states all medications should go to [facility] [phone] they are no longer using [facility], please resend SITagliptin (JANUVIA) 100 MG tablet to [facility] thank you. "them.  Will decrease his visits to 1 per week and monitor how he maintains.    -RE     OT Plan    OT Plan Comments continue  -RE       User Key  (r) = Recorded By, (t) = Taken By, (c) = Cosigned By    Initials Name Provider Type    TAYA Graham Occupational Therapist                            OT Goals       01/26/18 1300       OT Short Term Goals    STG Date to Achieve 02/09/18  -RE     STG 1 Patient will demonstrate proper awareness of What is Lymphedema and \"Do's & Don'ts\" for improved prevention, management, care of symptoms and ease of transition to self-care of condition.   -RE     STG 1 Progress Met  -RE     STG 2 Patient independent and compliant with self-massage techniques with spouse/family member as needed for improved lymphatic drainage, decreased edema/symptoms, decreased risk of infection and improved transition to self-care of condition.   -RE     STG 2 Progress Partially Met  -RE     STG 3 Patient independent and compliant with initial home exercise program focused on diaphragmatic breathing, range of motion, flexibility to decrease edema and improve lymphatic flow for decreased edema and decreased risk of infection.   -RE     STG 3 Progress Partially Met  -RE     STG 3 Progress Comments inconsistent  -RE     STG 4 Patient will report decreased fullness in the neck and face.   -RE     STG 4 Progress Met;Ongoing  -RE     STG 4 Progress Comments Is having flare ups.  -RE     Long Term Goals    LTG Date to Achieve 02/11/18  -RE     LTG 1 Edema in the submental area will decrease to 1/4.   -RE     LTG 1 Progress Ongoing  -RE     LTG 1 Progress Comments 2+ today  -RE     LTG 2 Pt will be independant and complaint with wearing the Bains swell spot.  -RE     LTG 2 Progress Not Met;Ongoing  -RE     Time Calculation    OT Goal Re-Cert Due Date 04/11/18  -RE       User Key  (r) = Recorded By, (t) = Taken By, (c) = Cosigned By    Initials Name Provider Type    TAYA Graham Occupational " Therapist          Therapy Education  Education Details: Discussed the need to have the Bains swell spot available for use when he has a flare up since he has had some recently.   Given: Edema management  Program: Reinforced  How Provided: Verbal  Provided to: Patient  Level of Understanding: Teach back education performed, Verbalized                Time Calculation:   OT Start Time: 1015  OT Stop Time: 1100  OT Time Calculation (min): 45 min       Therapy Charges for Today     Code Description Service Date Service Provider Modifiers Qty    40194335023 HC OT MANUAL THERAPY EA 15 MIN 1/26/2018 TAYA Childs GO 3                      TAYA Childs  1/26/2018

## 2023-04-18 NOTE — THERAPY DISCHARGE NOTE
Outpatient Occupational Therapy Lymphedema Treatment Note/Discharge Summary  [facility]     Patient Name: Lawrence Jefferson  : 1955  MRN: [id number]  Today's Date: 3/1/2018      Visit Date: 2018    There is no problem list on file for this patient.       Past Medical History:   Diagnosis Date   • Cancer    • Hypertension         Past Surgical History:   Procedure Laterality Date   • HERNIA REPAIR     • LARYNGECTOMY WITH NECK DISSECTION           Visit Dx:      ICD-10-CM ICD-9-CM   1. Lymphedema in adult patient I89.0 457.1             Lymphedema       18 1500          Subjective Comments    Subjective Comments No new complaints. Will likely have a surgical revision of his flap to tighten excess skin.  -RE      Subjective Pain    Able to rate subjective pain? yes  -RE      Pre-Treatment Pain Level 0  -RE      Post-Treatment Pain Level 0  -RE      Skin Changes/Observations    Skin Observations Comment mild decreased mobility on scars on R. Noted mild scar pucker on R.  -RE      Manual Lymphatic Drainage    Manual Lymphatic Drainage initial sequence;opened regional lymph nodes;opened anastamoses;head/neck treatment  -RE      Initial Sequence short neck;cervical;supraclavicular  -RE      Cervical right;left  -RE      Supraclavicular right;left  -RE      Shoulder Collectors right;left  -RE      Opened Regional Lymph Nodes axillary  -RE      Axillary right;left  -RE      Opened Anastamoses other opened anastamoses  -RE      Head/Neck Treatment pre-auricular nodes;post-auricular nodes;occipital nodes;sublingual nodes;full/complex MLD  -RE      Manual Lymphatic Drainage Comments scar massage bilaterally.  -RE        User Key  (r) = Recorded By, (t) = Taken By, (c) = Cosigned By    Initials Name Provider Type    RE TAYA Childs Occupational Therapist                        OT Assessment/Plan       18 1555       OT Assessment    Assessment Comments Has progressed well with good reduction of  "lymphedema symptoms and good maintanence of the reductions.  Pt knows what to do for maintanence.   -RE     OT Plan    OT Plan Comments D/c for now.  See as needed following surgery.  -RE       User Key  (r) = Recorded By, (t) = Taken By, (c) = Cosigned By    Initials Name Provider Type    TAYA Graham Occupational Therapist                            OT Goals       03/01/18 1500       OT Short Term Goals    STG 1 Patient will demonstrate proper awareness of What is Lymphedema and \"Do's & Don'ts\" for improved prevention, management, care of symptoms and ease of transition to self-care of condition.   -RE     STG 1 Progress Met  -RE     STG 2 Patient independent and compliant with self-massage techniques with spouse/family member as needed for improved lymphatic drainage, decreased edema/symptoms, decreased risk of infection and improved transition to self-care of condition.   -RE     STG 2 Progress Partially Met  -RE     STG 3 Patient independent and compliant with initial home exercise program focused on diaphragmatic breathing, range of motion, flexibility to decrease edema and improve lymphatic flow for decreased edema and decreased risk of infection.   -RE     STG 3 Progress Partially Met  -RE     STG 4 Patient will report decreased fullness in the neck and face.   -RE     STG 4 Progress Met  -RE     Long Term Goals    LTG 1 Edema in the submental area will decrease to 1/4.   -RE     LTG 1 Progress Met  -RE     LTG 2 Pt will be independant and complaint with wearing the Bains swell spot.  -RE     LTG 2 Progress Not Met  -RE     LTG 2 Progress Comments Pt has not ordered.   -RE       User Key  (r) = Recorded By, (t) = Taken By, (c) = Cosigned By    Initials Name Provider Type    TAYA Graham Occupational Therapist          Therapy Education  Education Details: Advised to have the Bains available for post op period.  Reminded to perform scar massage.  Given: Symptoms/condition management, " Edema management  Program: Reinforced  How Provided: Verbal  Provided to: Patient  Level of Understanding: Teach back education performed, Verbalized                Time Calculation:   OT Start Time: 1010  OT Stop Time: 1110  OT Time Calculation (min): 60 min       Therapy Charges for Today     Code Description Service Date Service Provider Modifiers Qty    [id number]  OT MANUAL THERAPY EA 15 MIN 3/1/2018 TAYA Childs GO 4                  OP OT Discharge Summary  Date of Discharge: 03/01/18  Reason for Discharge: Maximum functional potential achieved  Outcomes Achieved: Patient able to partially acheive established goals  Discharge Destination: Home with home program  Discharge Instructions: Call with any problems. Reconsult as needed.      TAYA Childs  3/1/2018   Glycopyrrolate Counseling:  I discussed with the patient the risks of glycopyrrolate including but not limited to skin rash, drowsiness, dry mouth, difficulty urinating, and blurred vision.